# Patient Record
Sex: FEMALE | Race: WHITE | HISPANIC OR LATINO | Employment: FULL TIME | URBAN - METROPOLITAN AREA
[De-identification: names, ages, dates, MRNs, and addresses within clinical notes are randomized per-mention and may not be internally consistent; named-entity substitution may affect disease eponyms.]

---

## 2018-06-13 ENCOUNTER — OFFICE VISIT (OUTPATIENT)
Dept: URGENT CARE | Facility: PHYSICIAN GROUP | Age: 18
End: 2018-06-13
Payer: COMMERCIAL

## 2018-06-13 ENCOUNTER — HOSPITAL ENCOUNTER (OUTPATIENT)
Facility: MEDICAL CENTER | Age: 18
End: 2018-06-13
Attending: PHYSICIAN ASSISTANT
Payer: COMMERCIAL

## 2018-06-13 ENCOUNTER — HOSPITAL ENCOUNTER (OUTPATIENT)
Dept: LAB | Facility: MEDICAL CENTER | Age: 18
End: 2018-06-13
Attending: PHYSICIAN ASSISTANT
Payer: COMMERCIAL

## 2018-06-13 VITALS
OXYGEN SATURATION: 98 % | HEIGHT: 67 IN | DIASTOLIC BLOOD PRESSURE: 84 MMHG | WEIGHT: 216 LBS | BODY MASS INDEX: 33.9 KG/M2 | TEMPERATURE: 97.5 F | HEART RATE: 62 BPM | SYSTOLIC BLOOD PRESSURE: 132 MMHG

## 2018-06-13 DIAGNOSIS — R10.30 LOWER ABDOMINAL PAIN: ICD-10-CM

## 2018-06-13 DIAGNOSIS — R39.15 URINARY URGENCY: ICD-10-CM

## 2018-06-13 DIAGNOSIS — R10.10 PAIN OF UPPER ABDOMEN: ICD-10-CM

## 2018-06-13 LAB
ALBUMIN SERPL BCP-MCNC: 4.6 G/DL (ref 3.2–4.9)
ALBUMIN/GLOB SERPL: 1.4 G/DL
ALP SERPL-CCNC: 64 U/L (ref 30–99)
ALT SERPL-CCNC: 29 U/L (ref 2–50)
ANION GAP SERPL CALC-SCNC: 7 MMOL/L (ref 0–11.9)
APPEARANCE UR: CLEAR
AST SERPL-CCNC: 20 U/L (ref 12–45)
BASOPHILS # BLD AUTO: 0.6 % (ref 0–1.8)
BASOPHILS # BLD: 0.04 K/UL (ref 0–0.12)
BILIRUB SERPL-MCNC: 0.8 MG/DL (ref 0.1–1.5)
BILIRUB UR STRIP-MCNC: NORMAL MG/DL
BUN SERPL-MCNC: 9 MG/DL (ref 8–22)
CALCIUM SERPL-MCNC: 10.3 MG/DL (ref 8.5–10.5)
CHLORIDE SERPL-SCNC: 105 MMOL/L (ref 96–112)
CO2 SERPL-SCNC: 25 MMOL/L (ref 20–33)
COLOR UR AUTO: NORMAL
CREAT SERPL-MCNC: 0.58 MG/DL (ref 0.5–1.4)
EOSINOPHIL # BLD AUTO: 0.08 K/UL (ref 0–0.51)
EOSINOPHIL NFR BLD: 1.2 % (ref 0–6.9)
ERYTHROCYTE [DISTWIDTH] IN BLOOD BY AUTOMATED COUNT: 37.2 FL (ref 35.9–50)
GLOBULIN SER CALC-MCNC: 3.4 G/DL (ref 1.9–3.5)
GLUCOSE SERPL-MCNC: 104 MG/DL (ref 65–99)
GLUCOSE UR STRIP.AUTO-MCNC: NORMAL MG/DL
HCT VFR BLD AUTO: 44.9 % (ref 37–47)
HGB BLD-MCNC: 15.5 G/DL (ref 12–16)
IMM GRANULOCYTES # BLD AUTO: 0.02 K/UL (ref 0–0.11)
IMM GRANULOCYTES NFR BLD AUTO: 0.3 % (ref 0–0.9)
INT CON NEG: NEGATIVE
INT CON POS: POSITIVE
KETONES UR STRIP.AUTO-MCNC: NORMAL MG/DL
LEUKOCYTE ESTERASE UR QL STRIP.AUTO: NORMAL
LIPASE SERPL-CCNC: 16 U/L (ref 11–82)
LYMPHOCYTES # BLD AUTO: 1.62 K/UL (ref 1–4.8)
LYMPHOCYTES NFR BLD: 24 % (ref 22–41)
MCH RBC QN AUTO: 29.8 PG (ref 27–33)
MCHC RBC AUTO-ENTMCNC: 34.5 G/DL (ref 33.6–35)
MCV RBC AUTO: 86.2 FL (ref 81.4–97.8)
MONOCYTES # BLD AUTO: 0.46 K/UL (ref 0–0.85)
MONOCYTES NFR BLD AUTO: 6.8 % (ref 0–13.4)
NEUTROPHILS # BLD AUTO: 4.53 K/UL (ref 2–7.15)
NEUTROPHILS NFR BLD: 67.1 % (ref 44–72)
NITRITE UR QL STRIP.AUTO: NORMAL
NRBC # BLD AUTO: 0 K/UL
NRBC BLD-RTO: 0 /100 WBC
PH UR STRIP.AUTO: 7 [PH] (ref 5–8)
PLATELET # BLD AUTO: 309 K/UL (ref 164–446)
PMV BLD AUTO: 11 FL (ref 9–12.9)
POC URINE PREGNANCY TEST: NORMAL
POTASSIUM SERPL-SCNC: 3.9 MMOL/L (ref 3.6–5.5)
PROT SERPL-MCNC: 8 G/DL (ref 6–8.2)
PROT UR QL STRIP: NORMAL MG/DL
RBC # BLD AUTO: 5.21 M/UL (ref 4.2–5.4)
RBC UR QL AUTO: NORMAL
SODIUM SERPL-SCNC: 137 MMOL/L (ref 135–145)
SP GR UR STRIP.AUTO: 1.01
UROBILINOGEN UR STRIP-MCNC: NORMAL MG/DL
WBC # BLD AUTO: 6.8 K/UL (ref 4.8–10.8)

## 2018-06-13 PROCEDURE — 36415 COLL VENOUS BLD VENIPUNCTURE: CPT

## 2018-06-13 PROCEDURE — 85025 COMPLETE CBC W/AUTO DIFF WBC: CPT

## 2018-06-13 PROCEDURE — 99214 OFFICE O/P EST MOD 30 MIN: CPT | Performed by: PHYSICIAN ASSISTANT

## 2018-06-13 PROCEDURE — 80053 COMPREHEN METABOLIC PANEL: CPT

## 2018-06-13 PROCEDURE — 87086 URINE CULTURE/COLONY COUNT: CPT

## 2018-06-13 PROCEDURE — 81025 URINE PREGNANCY TEST: CPT | Performed by: PHYSICIAN ASSISTANT

## 2018-06-13 PROCEDURE — 81002 URINALYSIS NONAUTO W/O SCOPE: CPT | Performed by: PHYSICIAN ASSISTANT

## 2018-06-13 PROCEDURE — 83690 ASSAY OF LIPASE: CPT

## 2018-06-13 RX ORDER — ONDANSETRON 4 MG/1
4 TABLET, FILM COATED ORAL EVERY 8 HOURS PRN
Qty: 15 TAB | Refills: 0 | Status: SHIPPED | OUTPATIENT
Start: 2018-06-13 | End: 2018-06-18

## 2018-06-13 RX ORDER — SULFAMETHOXAZOLE AND TRIMETHOPRIM 800; 160 MG/1; MG/1
1 TABLET ORAL 2 TIMES DAILY
Qty: 14 TAB | Refills: 0 | Status: SHIPPED | OUTPATIENT
Start: 2018-06-13 | End: 2018-06-18

## 2018-06-13 NOTE — LETTER
June 13, 2018         Patient: Salena Anderson   YOB: 2000   Date of Visit: 6/13/2018           To Whom it May Concern:    Salena Anderson was seen in my clinic on 6/13/2018.  She is to be of work today and tomorrow to recover.      If you have any questions or concerns, please don't hesitate to call.        Sincerely,           Margarette Horner P.A.-C.  Electronically Signed

## 2018-06-13 NOTE — PROGRESS NOTES
Chief Complaint   Patient presents with   • Abdominal Pain     mid abdominal pain, vomiting, loose stools x2 days       HISTORY OF PRESENT ILLNESS: Patient is a 17 y.o. female who presents today for about 48 hours of feeling unwell with nausea/vomiting.   Patient vomited last this morning.    She has had some loose stools but not severe diarrhea.  No bloody stool or vomit.   She has been having some increased urgency and frequency of urination as well and some irritation with urination. She is having some lower back pains, bilateral, aching and inconsistent in nature.    She is having some mid to lower stomach discomfort that comes and goes but feels it is getting worse overall.   No fevers, chills or sweats.  She has hx of appendectomy.   No vaginal discharge.   She states she does not get periods as she has Implanon.  No attempted interventions for her symptoms.       There are no active problems to display for this patient.      Allergies:Patient has no known allergies.    No current [facility]-ordered outpatient prescriptions on file.     No current [facility]-ordered facility-administered medications on file.        Past Medical History:   Diagnosis Date   • Ingrown nail    • Strep throat        Social History   Substance Use Topics   • Smoking status: Never Smoker   • Smokeless tobacco: Never Used   • Alcohol use No       Family Status   Relation Status   • Mother Alive   • Father Alive   No family history on file. No pertinent FH    ROS:  Review of Systems   Constitutional: Negative for fever, chills, weight loss and malaise/fatigue.   HENT: Negative for ear pain, nosebleeds, congestion, sore throat and neck pain.    Eyes: Negative for blurred vision.   Respiratory: Negative for cough, sputum production, shortness of breath and wheezing.    Cardiovascular: Negative for chest pain, palpitations, orthopnea and leg swelling.   Gastrointestinal: SEE HPI  Genitourinary: SEE HPI    Exam:  Blood pressure 132/84, pulse 62,  "temperature 36.4 °C (97.5 °F), height 1.702 m (5' 7\"), weight 98 kg (216 lb), SpO2 98 %.  General:  Well nourished, well developed female in NAD  Eyes: PERRLA, EOM within normal limits, no conjunctival injection, no scleral icterus, visual fields and acuity grossly intact.  Mouth: reasonable hygiene, no erythema exudates or tonsillar enlargement.  Neck: no masses, range of motion within normal limits, no tracheal deviation. No lymphadenopathy  Pulmonary: Normal respiratory effort, no wheezes, crackles, or rhonchi.  Cardiovascular: regular rate and rhythm without murmurs, rubs, or gallops.  Abdomen: Soft, mild mid-lower abdomen discomfort into suprapubic region, nondistended. Normal bowel sounds. No hepatosplenomegaly or masses, or hernias. No rebound or guarding.  No CVA tenderness.   Skin: No visible rashes or lesion. Warm, pink, dry.   Extremities: no clubbing, cyanosis, or edema.  Neuro: A&O x 3. Speech normal/clear.  Normal gait.     Component Results     Component Value Ref Range & Units Status   WBC 6.8  4.8 - 10.8 K/uL Final   RBC 5.21  4.20 - 5.40 M/uL Final   Hemoglobin 15.5  12.0 - 16.0 g/dL Final   Hematocrit 44.9  37.0 - 47.0 % Final   MCV 86.2  81.4 - 97.8 fL Final   MCH 29.8  27.0 - 33.0 pg Final   MCHC 34.5  33.6 - 35.0 g/dL Final   RDW 37.2  35.9 - 50.0 fL Final   Platelet Count 309  164 - 446 K/uL Final   MPV 11.0  9.0 - 12.9 fL Final   Neutrophils-Polys 67.10  44.00 - 72.00 % Final   Lymphocytes 24.00  22.00 - 41.00 % Final   Monocytes 6.80  0.00 - 13.40 % Final   Eosinophils 1.20  0.00 - 6.90 % Final   Basophils 0.60  0.00 - 1.80 % Final   Immature Granulocytes 0.30  0.00 - 0.90 % Final   Nucleated RBC 0.00  /100 WBC Final   Neutrophils (Absolute) 4.53  2.00 - 7.15 K/uL Final   Comment:   Includes immature neutrophils, if present.   Lymphs (Absolute) 1.62  1.00 - 4.80 K/uL Final   Monos (Absolute) 0.46  0.00 - 0.85 K/uL Final   Eos (Absolute) 0.08  0.00 - 0.51 K/uL Final   Baso (Absolute) 0.04  " 0.00 - 0.12 K/uL Final   Immature Granulocytes (abs) 0.02  0.00 - 0.11 K/uL Final   NRBC (Absolute) 0.00  K/uL Final       Component Results     Component Value Ref Range & Units Status   Sodium 137  135 - 145 mmol/L Final   Potassium 3.9  3.6 - 5.5 mmol/L Final   Chloride 105  96 - 112 mmol/L Final   Co2 25  20 - 33 mmol/L Final   Anion Gap 7.0  0.0 - 11.9 Final   Glucose 104   65 - 99 mg/dL Final   Bun 9  8 - 22 mg/dL Final   Creatinine 0.58  0.50 - 1.40 mg/dL Final   Calcium 10.3  8.5 - 10.5 mg/dL Final   AST(SGOT) 20  12 - 45 U/L Final   ALT(SGPT) 29  2 - 50 U/L Final   Alkaline Phosphatase 64  30 - 99 U/L Final   Total Bilirubin 0.8  0.1 - 1.5 mg/dL Final   Albumin 4.6  3.2 - 4.9 g/dL Final   Total Protein 8.0  6.0 - 8.2 g/dL Final   Globulin 3.4  1.9 - 3.5 g/dL Final   A-G Ratio 1.4  g/dL Final       Component Results     Component Value Ref Range & Units Status   Lipase 16  11 - 82 U/L Final     Component Results     Component Value Ref Range & Units Status   POC Color light yellow  Negative Final   POC Appearance clear  Negative Final   POC Leukocyte Esterase neg  Negative Final   POC Nitrites pos  Negative Final   POC Urobiligen neg  Negative (0.2) mg/dL Final   POC Protein neg  Negative mg/dL Final   POC Urine PH 7.0  5.0 - 8.0 Final   POC Blood 2+  Negative Final   POC Specific Gravity 1.015  <1.005 - >1.030 Final   POC Ketones neg  Negative mg/dL Final   POC Bilirubin neg  Negative mg/dL Final   POC Glucose neg  Negative mg/dL Final       Component Results     Component Value Ref Range & Units Status   POC Urine Pregnancy Test neg  Negative Final       Assessment/Plan:  1. Lower abdominal pain  CBC WITH DIFFERENTIAL    COMP METABOLIC PANEL    LIPASE    POCT Urinalysis    POCT Pregnancy    ondansetron (ZOFRAN) 4 MG Tab tablet   2. Urinary urgency  URINE CULTURE(NEW)    sulfamethoxazole-trimethoprim (BACTRIM DS) 800-160 MG tablet       -labs as above, grossly unremarkable other than POCT u/a with nitrites  and blood.   Patient is endorsing UTI symptoms currently as well.   -given this and otherwise benign work up patient will be placed on Bactrim and culture sent.   -Fluids emphasized.  Void before/after intercourse.  Recommend abstain until treatment complete/symptoms resolved.   -signs and symptoms of worsening/ascending infection discussed and to seek prompt medical care should they arise.   -patient given RTC precautions regarding abdominal pain as well as ER precautions.        Supportive care, differential diagnoses, and indications for immediate follow-up discussed with patient.   Pathogenesis of diagnosis discussed including typical length and natural progression.   Instructed to return to clinic or nearest emergency department for any change in condition, further concerns, or worsening of symptoms.  Patient states understanding of the plan of care and discharge instructions.  Instructed to make an appointment, for follow up, with their primary care provider.      Margarette Horner P.A.-C.

## 2018-06-14 DIAGNOSIS — R39.15 URINARY URGENCY: ICD-10-CM

## 2018-06-14 LAB
AMBIGUOUS DTTM AMBI4: NORMAL
SIGNIFICANT IND 70042: NORMAL
SITE SITE: NORMAL
SOURCE SOURCE: NORMAL

## 2018-06-16 LAB
BACTERIA UR CULT: NORMAL
SIGNIFICANT IND 70042: NORMAL
SITE SITE: NORMAL
SOURCE SOURCE: NORMAL

## 2018-06-18 ENCOUNTER — OFFICE VISIT (OUTPATIENT)
Dept: URGENT CARE | Facility: CLINIC | Age: 18
End: 2018-06-18
Payer: COMMERCIAL

## 2018-06-18 VITALS
HEIGHT: 67 IN | TEMPERATURE: 97.8 F | HEART RATE: 77 BPM | RESPIRATION RATE: 16 BRPM | WEIGHT: 216 LBS | BODY MASS INDEX: 33.9 KG/M2 | DIASTOLIC BLOOD PRESSURE: 72 MMHG | SYSTOLIC BLOOD PRESSURE: 120 MMHG | OXYGEN SATURATION: 100 %

## 2018-06-18 DIAGNOSIS — R10.9 BELLY PAIN: ICD-10-CM

## 2018-06-18 LAB
APPEARANCE UR: NORMAL
BILIRUB UR STRIP-MCNC: NEGATIVE MG/DL
COLOR UR AUTO: YELLOW
GLUCOSE UR STRIP.AUTO-MCNC: NEGATIVE MG/DL
INT CON NEG: NEGATIVE
INT CON POS: POSITIVE
KETONES UR STRIP.AUTO-MCNC: NEGATIVE MG/DL
LEUKOCYTE ESTERASE UR QL STRIP.AUTO: NEGATIVE
NITRITE UR QL STRIP.AUTO: NEGATIVE
PH UR STRIP.AUTO: 5 [PH] (ref 5–8)
POC URINE PREGNANCY TEST: NEGATIVE
PROT UR QL STRIP: NEGATIVE MG/DL
RBC UR QL AUTO: NORMAL
SP GR UR STRIP.AUTO: 1.03
UROBILINOGEN UR STRIP-MCNC: NEGATIVE MG/DL

## 2018-06-18 PROCEDURE — 81025 URINE PREGNANCY TEST: CPT | Performed by: FAMILY MEDICINE

## 2018-06-18 PROCEDURE — 81002 URINALYSIS NONAUTO W/O SCOPE: CPT | Performed by: FAMILY MEDICINE

## 2018-06-18 PROCEDURE — 99214 OFFICE O/P EST MOD 30 MIN: CPT | Performed by: FAMILY MEDICINE

## 2018-06-18 RX ORDER — MELOXICAM 15 MG/1
15 TABLET ORAL DAILY
Qty: 7 TAB | Refills: 1 | Status: SHIPPED | OUTPATIENT
Start: 2018-06-18 | End: 2018-06-25

## 2018-06-18 RX ORDER — KETOROLAC TROMETHAMINE 30 MG/ML
60 INJECTION, SOLUTION INTRAMUSCULAR; INTRAVENOUS ONCE
Status: COMPLETED | OUTPATIENT
Start: 2018-06-18 | End: 2018-06-18

## 2018-06-18 RX ADMIN — KETOROLAC TROMETHAMINE 60 MG: 30 INJECTION, SOLUTION INTRAMUSCULAR; INTRAVENOUS at 09:53

## 2018-06-18 ASSESSMENT — ENCOUNTER SYMPTOMS
MYALGIAS: 1
FOCAL WEAKNESS: 0
NAUSEA: 0
DIARRHEA: 0
DIZZINESS: 0
VOMITING: 0
BLOOD IN STOOL: 1
CHILLS: 0
FLANK PAIN: 1
ABDOMINAL PAIN: 1
FEVER: 0

## 2018-06-18 NOTE — LETTER
June 18, 2018         Patient: Salena Anderson   YOB: 2000   Date of Visit: 6/18/2018           To Whom it May Concern:    Salena Anderson was seen in my clinic on 6/18/2018. She may return to work on 06/20/18.    If you have any questions or concerns, please don't hesitate to call.        Sincerely,           Bart Bradshaw M.D.  Electronically Signed

## 2018-06-18 NOTE — PROGRESS NOTES
"Subjective:      Salena Anderson is a 17 y.o. female who presents with LLQ Pain (diagnosed with bladder infection, taking medication and feels like it is making her LLQ painful x 5 days )    Chief Complaint   Patient presents with   • LLQ Pain     diagnosed with bladder infection, taking medication and feels like it is making her LLQ painful x 5 days         - This is a very pleasant 17 y.o. female with complaints of on/off sharp pain LLQ x 4 days. Also a little pain Lt flank. No NVFC, feeding/drinking well, no blood in stool. No urinary symptoms           ALLERGIES:  Patient has no known allergies.     PMH:  Past Medical History:   Diagnosis Date   • Ingrown nail    • Strep throat         MEDS:    Current Outpatient Prescriptions:   •  meloxicam (MOBIC) 15 MG tablet, Take 1 Tab by mouth every day for 7 days., Disp: 7 Tab, Rfl: 1    Current Facility-Administered Medications:   •  ketorolac (TORADOL) injection 60 mg, 60 mg, Intramuscular, Once, Bart Bradshaw M.D.    ** I have documented what I find to be significant in regards to past medical, social, family and surgical history  in my HPI or under PMH/PSH/FH review section, otherwise it is contributory **           HPI    Review of Systems   Constitutional: Negative for chills and fever.   Gastrointestinal: Positive for abdominal pain and blood in stool. Negative for diarrhea, nausea and vomiting.   Genitourinary: Positive for flank pain. Negative for dysuria, frequency and hematuria.   Musculoskeletal: Positive for myalgias.   Neurological: Negative for dizziness and focal weakness.          Objective:     /72   Pulse 77   Temp 36.6 °C (97.8 °F)   Resp 16   Ht 1.702 m (5' 7\")   Wt 98 kg (216 lb)   SpO2 100%   BMI 33.83 kg/m²      Physical Exam   Constitutional: She appears well-developed. No distress.   HENT:   Head: Normocephalic and atraumatic.   Neck: Neck supple.   Cardiovascular: Regular rhythm.    No murmur heard.  Pulmonary/Chest: Effort " normal. No respiratory distress.   Abdominal: Soft. She exhibits no distension. There is tenderness ( mild LLQ). There is no rebound and no guarding.   Neurological: She is alert. She exhibits normal muscle tone.   Skin: Skin is warm and dry.   Psychiatric: She has a normal mood and affect. Judgment normal.   Nursing note and vitals reviewed.              Assessment/Plan:         1. Belly pain  POCT Urinalysis    POCT Pregnancy    ketorolac (TORADOL) injection 60 mg    meloxicam (MOBIC) 15 MG tablet       * suspect ovarian cyst vs small renal stone passing. Rest hydrate. If not improving in couple days or getting worse go to ER      Dx & d/c instructions discussed w/ patient and/or family members. Follow up w/ Prvt Dr or here in 2 days if not getting better, sooner if needed,  ER if worse and UC/PCP unavailable.        Possible side effects (i.e. Rash, GI upset/constipation, sedation, elevation of BP or sugars) of any medications given discussed.

## 2018-11-27 ENCOUNTER — HOSPITAL ENCOUNTER (OUTPATIENT)
Facility: MEDICAL CENTER | Age: 18
End: 2018-11-27
Attending: EMERGENCY MEDICINE
Payer: COMMERCIAL

## 2018-11-27 ENCOUNTER — OFFICE VISIT (OUTPATIENT)
Dept: URGENT CARE | Facility: PHYSICIAN GROUP | Age: 18
End: 2018-11-27
Payer: COMMERCIAL

## 2018-11-27 VITALS
OXYGEN SATURATION: 99 % | HEART RATE: 76 BPM | RESPIRATION RATE: 18 BRPM | BODY MASS INDEX: 34.93 KG/M2 | TEMPERATURE: 97 F | DIASTOLIC BLOOD PRESSURE: 80 MMHG | SYSTOLIC BLOOD PRESSURE: 122 MMHG | WEIGHT: 223 LBS

## 2018-11-27 DIAGNOSIS — R19.7 DIARRHEA OF PRESUMED INFECTIOUS ORIGIN: ICD-10-CM

## 2018-11-27 PROCEDURE — 87899 AGENT NOS ASSAY W/OPTIC: CPT

## 2018-11-27 PROCEDURE — 99213 OFFICE O/P EST LOW 20 MIN: CPT | Performed by: EMERGENCY MEDICINE

## 2018-11-27 PROCEDURE — 87046 STOOL CULTR AEROBIC BACT EA: CPT

## 2018-11-27 PROCEDURE — 87045 FECES CULTURE AEROBIC BACT: CPT

## 2018-11-27 RX ORDER — ONDANSETRON 4 MG/1
4 TABLET, ORALLY DISINTEGRATING ORAL EVERY 6 HOURS PRN
Qty: 10 TAB | Refills: 0 | Status: ON HOLD | OUTPATIENT
Start: 2018-11-27 | End: 2021-08-07

## 2018-11-27 RX ORDER — BENZONATATE 100 MG/1
100 CAPSULE ORAL 3 TIMES DAILY PRN
Qty: 60 CAP | Refills: 0 | Status: ON HOLD | OUTPATIENT
Start: 2018-11-27 | End: 2021-08-07

## 2018-11-27 RX ORDER — ONDANSETRON 4 MG/1
4 TABLET, ORALLY DISINTEGRATING ORAL ONCE
Status: COMPLETED | OUTPATIENT
Start: 2018-11-27 | End: 2018-11-27

## 2018-11-27 RX ADMIN — ONDANSETRON 4 MG: 4 TABLET, ORALLY DISINTEGRATING ORAL at 11:14

## 2018-11-27 NOTE — LETTER
November 27, 2018        Salena Anderson  [address] [address]        Dear Salena VILLA:    PLEASE ASK FOR THE NEXT 2-3 DAYS OFF FROM WORK.    If you have any questions or concerns, please don't hesitate to call.        Sincerely,        Michael Oliva M.D.    Electronically Signed

## 2018-11-28 LAB
E COLI SXT1+2 STL IA: NORMAL
SIGNIFICANT IND 70042: NORMAL
SITE SITE: NORMAL
SOURCE SOURCE: NORMAL

## 2018-11-29 ENCOUNTER — TELEPHONE (OUTPATIENT)
Dept: URGENT CARE | Facility: PHYSICIAN GROUP | Age: 18
End: 2018-11-29

## 2018-11-29 ASSESSMENT — ENCOUNTER SYMPTOMS
VOMITING: 1
DIARRHEA: 1
SORE THROAT: 0
SPUTUM PRODUCTION: 0
FEVER: 1
EYE REDNESS: 0
NERVOUS/ANXIOUS: 0
ABDOMINAL PAIN: 1
SHORTNESS OF BREATH: 0
SENSORY CHANGE: 0
BLOOD IN STOOL: 0
EYE DISCHARGE: 0
NAUSEA: 1
COUGH: 1
SPEECH CHANGE: 0

## 2018-11-29 NOTE — PROGRESS NOTES
Subjective:      Salena Anderson is a 18 y.o. female who presents with Fever (x6 days and cough , N/V/D x1 day, right upper chest near shoulder hurts )    HPI   Pt with NVD for the past few days, no foreign travel, ccamping or antibiotics, No blood in the stool or vomitus. Pt is non smoker, with a dry cough improving. No one else is ill at home.  PMH:  has a past medical history of Ingrown nail and Strep throat. She also has no past medical history of Allergy; ASTHMA; or Diabetes.  MEDS:   Current Outpatient Prescriptions:   •  ondansetron (ZOFRAN ODT) 4 MG TABLET DISPERSIBLE, Take 1 Tab by mouth every 6 hours as needed for Nausea., Disp: 10 Tab, Rfl: 0  •  benzonatate (TESSALON) 100 MG Cap, Take 1 Cap by mouth 3 times a day as needed for Cough., Disp: 60 Cap, Rfl: 0  ALLERGIES: No Known Allergies  SURGHX: No past surgical history on file.  SOCHX:  reports that she has never smoked. She has never used smokeless tobacco. She reports that she does not drink alcohol or use drugs.  FH: family history is not on file.  Review of Systems   Constitutional: Positive for fever.   HENT: Negative for sore throat.    Eyes: Negative for discharge and redness.   Respiratory: Positive for cough. Negative for sputum production and shortness of breath.    Cardiovascular: Positive for chest pain.   Gastrointestinal: Positive for abdominal pain, diarrhea, nausea and vomiting. Negative for blood in stool.   Skin: Negative for rash.   Neurological: Negative for sensory change and speech change.   Psychiatric/Behavioral: The patient is not nervous/anxious.           Objective:     /80   Pulse 76   Temp 36.1 °C (97 °F)   Resp 18   Wt 101.2 kg (223 lb)   SpO2 99%   BMI 34.93 kg/m²      Physical Exam   Constitutional: She appears well-developed and well-nourished. No distress.   HENT:   Head: Normocephalic and atraumatic.   Right Ear: External ear normal.   Left Ear: External ear normal.   Neck: Normal range of motion.    Cardiovascular: Normal rate and regular rhythm.    Pulmonary/Chest: Effort normal and breath sounds normal. No respiratory distress. She has no wheezes. She has no rales.   Abdominal: She exhibits no distension. There is no tenderness. There is no guarding.   Musculoskeletal: Normal range of motion.   Neurological: She is alert.   Skin: Skin is warm and dry. She is not diaphoretic.   Psychiatric: She has a normal mood and affect. Her behavior is normal.   Nursing note and vitals reviewed.              Assessment/Plan:     1. Diarrhea of presumed infectious origin    - ondansetron (ZOFRAN ODT) dispertab 4 mg; Take 1 Tab by mouth Once.  - ondansetron (ZOFRAN ODT) 4 MG TABLET DISPERSIBLE; Take 1 Tab by mouth every 6 hours as needed for Nausea.  Dispense: 10 Tab; Refill: 0  - CULTURE STOOL; Future  - benzonatate (TESSALON) 100 MG Cap; Take 1 Cap by mouth 3 times a day as needed for Cough.  Dispense: 60 Cap; Refill: 0      I am recommending the patient initiate/ continue hydration efforts including the use of a vaporizer/humidifier/ netti pot. I also recommend the pt, initiate Mucinex (if older than 4) Sudafed or Dayquil if not hypertensive. In addition the patient will initiate the prescribed prescription medication/s: terssalon perles for the cough. Zofran for the nausea / vomiting. Pt will use clear liquids for the next day. No dairy for the next 5 days. She can use imodium for the diarrhea and bring in a stool for culture. I will call with the results.. If the patient's condition exacerbates with worsening dysphagia, shortness of breath, uncontrolled fever, headache or chest pressure he/she will return immediately to the urgent care or go to  the emergency department for further evaluation.  Pt will be given a work note.  Michael Oliva

## 2018-12-01 ENCOUNTER — TELEPHONE (OUTPATIENT)
Dept: URGENT CARE | Facility: PHYSICIAN GROUP | Age: 18
End: 2018-12-01

## 2018-12-01 LAB
BACTERIA STL CULT: NORMAL
E COLI SXT1+2 STL IA: NORMAL
SIGNIFICANT IND 70042: NORMAL
SITE SITE: NORMAL
SOURCE SOURCE: NORMAL

## 2019-02-05 ENCOUNTER — OFFICE VISIT (OUTPATIENT)
Dept: URGENT CARE | Facility: MEDICAL CENTER | Age: 19
End: 2019-02-05
Payer: COMMERCIAL

## 2019-02-05 VITALS
RESPIRATION RATE: 16 BRPM | HEIGHT: 67 IN | BODY MASS INDEX: 35 KG/M2 | DIASTOLIC BLOOD PRESSURE: 68 MMHG | SYSTOLIC BLOOD PRESSURE: 108 MMHG | WEIGHT: 223 LBS | TEMPERATURE: 96.9 F | OXYGEN SATURATION: 98 % | HEART RATE: 79 BPM

## 2019-02-05 DIAGNOSIS — J06.9 ACUTE URI: Primary | ICD-10-CM

## 2019-02-05 DIAGNOSIS — J02.9 SORE THROAT: ICD-10-CM

## 2019-02-05 DIAGNOSIS — R05.9 COUGH: ICD-10-CM

## 2019-02-05 DIAGNOSIS — R52 BODY ACHES: ICD-10-CM

## 2019-02-05 LAB
FLUAV+FLUBV AG SPEC QL IA: NEGATIVE
INT CON NEG: NEGATIVE
INT CON NEG: NEGATIVE
INT CON POS: POSITIVE
INT CON POS: POSITIVE
S PYO AG THROAT QL: NEGATIVE

## 2019-02-05 PROCEDURE — 87880 STREP A ASSAY W/OPTIC: CPT | Performed by: PHYSICIAN ASSISTANT

## 2019-02-05 PROCEDURE — 87804 INFLUENZA ASSAY W/OPTIC: CPT | Performed by: PHYSICIAN ASSISTANT

## 2019-02-05 PROCEDURE — 99214 OFFICE O/P EST MOD 30 MIN: CPT | Performed by: PHYSICIAN ASSISTANT

## 2019-02-05 RX ORDER — CODEINE PHOSPHATE/GUAIFENESIN 10-100MG/5
10 LIQUID (ML) ORAL 4 TIMES DAILY PRN
Qty: 200 ML | Refills: 0 | Status: SHIPPED | OUTPATIENT
Start: 2019-02-05 | End: 2019-02-10

## 2019-02-05 ASSESSMENT — ENCOUNTER SYMPTOMS
COUGH: 1
HEADACHES: 1
RHINORRHEA: 1
SWOLLEN GLANDS: 0
SPUTUM PRODUCTION: 1
ABDOMINAL PAIN: 0
DIARRHEA: 0
SORE THROAT: 1
VOMITING: 0
SHORTNESS OF BREATH: 0
FEVER: 1
WHEEZING: 0
STRIDOR: 0

## 2019-02-05 NOTE — LETTER
February 5, 2019         Patient: Salena Anderson   YOB: 2000   Date of Visit: 2/5/2019           To Whom it May Concern:    Salena Anderson was seen in my clinic on 2/5/2019. She may return to work on 02/07/2019.    If you have any questions or concerns, please don't hesitate to call.        Sincerely,           Beckie Sharif P.A.-C.  Electronically Signed

## 2019-02-05 NOTE — PROGRESS NOTES
Subjective:      Salena Anderson is a 18 y.o. female who presents with Fever (chills, cough, sore throat, x 3 days )    PMH:  has a past medical history of Ingrown nail and Strep throat. She also has no past medical history of Allergy; ASTHMA; or Diabetes.  MEDS:   Current Outpatient Prescriptions:   •  guaifenesin-codeine (TUSSI-ORGANIDIN NR) 100-10 MG/5ML syrup, Take 10 mL by mouth 4 times a day as needed for Cough for up to 5 days., Disp: 200 mL, Rfl: 0  •  ondansetron (ZOFRAN ODT) 4 MG TABLET DISPERSIBLE, Take 1 Tab by mouth every 6 hours as needed for Nausea., Disp: 10 Tab, Rfl: 0  •  benzonatate (TESSALON) 100 MG Cap, Take 1 Cap by mouth 3 times a day as needed for Cough., Disp: 60 Cap, Rfl: 0  ALLERGIES: No Known Allergies  SURGHX: History reviewed. No pertinent surgical history.  SOCHX:  reports that she has never smoked. She has never used smokeless tobacco. She reports that she does not drink alcohol or use drugs.  FH: Reviewed with patient, not pertinent to this visit.             Patient presents with:  Fever: chills, cough, sore throat, x 3 days , cough is keeping her awake at night.  + flu and + strep exposure at work, would like to be tested for both.         URI    This is a new problem. Episode onset: 3 days. The problem has been unchanged. The maximum temperature recorded prior to her arrival was 100.4 - 100.9 F. The fever has been present for 1 to 2 days. Associated symptoms include congestion, coughing, headaches, rhinorrhea and a sore throat. Pertinent negatives include no abdominal pain, diarrhea, ear pain, joint pain, plugged ear sensation, swollen glands, vomiting or wheezing. She has tried acetaminophen (motrin , otc cold medicine) for the symptoms. The treatment provided mild relief.       Review of Systems   Constitutional: Positive for fever.   HENT: Positive for congestion, rhinorrhea and sore throat. Negative for ear pain.    Respiratory: Positive for cough and sputum production.  "Negative for shortness of breath, wheezing and stridor.    Gastrointestinal: Negative for abdominal pain, diarrhea and vomiting.   Musculoskeletal: Negative for joint pain.   Neurological: Positive for headaches.   All other systems reviewed and are negative.         Objective:     /68   Pulse 79   Temp 36.1 °C (96.9 °F)   Resp 16   Ht 1.702 m (5' 7\")   Wt 101.2 kg (223 lb)   SpO2 98%   BMI 34.93 kg/m²      Physical Exam   Constitutional: She is oriented to person, place, and time. She appears well-developed and well-nourished. She is cooperative.  Non-toxic appearance. No distress.   HENT:   Head: Normocephalic and atraumatic.   Right Ear: Tympanic membrane normal.   Left Ear: Tympanic membrane normal.   Nose: Mucosal edema and rhinorrhea present.   Mouth/Throat: Uvula is midline and mucous membranes are normal. Posterior oropharyngeal erythema present. Tonsils are 1+ on the right. Tonsils are 2+ on the left. No tonsillar exudate.   Eyes: Pupils are equal, round, and reactive to light. Conjunctivae and EOM are normal.   Neck: Normal range of motion. Neck supple.   Cardiovascular: Normal rate, regular rhythm and normal heart sounds.    Pulmonary/Chest: Effort normal. No respiratory distress. She has rhonchi.   Abdominal: Soft.   Musculoskeletal: Normal range of motion.   Neurological: She is alert and oriented to person, place, and time. Gait normal.   Skin: Skin is warm and dry. Capillary refill takes less than 2 seconds.   Psychiatric: She has a normal mood and affect.   Nursing note and vitals reviewed.         Flu: neg  Strep:neg     Assessment/Plan:     1. Acute URI  guaifenesin-codeine (TUSSI-ORGANIDIN NR) 100-10 MG/5ML syrup   2. Sore throat  POCT Rapid Strep A    POCT Influenza A/B    guaifenesin-codeine (TUSSI-ORGANIDIN NR) 100-10 MG/5ML syrup   3. Body aches  POCT Rapid Strep A    POCT Influenza A/B    guaifenesin-codeine (TUSSI-ORGANIDIN NR) 100-10 MG/5ML syrup   4. Cough  POCT Rapid Strep A "    POCT Influenza A/B    guaifenesin-codeine (TUSSI-ORGANIDIN NR) 100-10 MG/5ML syrup     Discussed that I felt this was viral in nature. Did not see any evidence of a bacterial process. Discussed natural progression and sx care.    PT advised saltwater gargles/swishes  3-4 times daily until symptoms improve.     Motrin/Advil/Ibuprophen 600 mg every 6 hours as needed for pain or fever.    PT instructed not to drive or operate heavy machinery or drink alcohol while taking this medication because it contains either a narcotic or benzodiazepines which causes drowsiness. PT verbalized understanding of these instructions.     [facility] Aware web site evaluation: I have obtained and reviewed patient utilization report from [facility] pharmacy database prior to writing prescription for controlled substance.  No history of abuse.    PT should follow up with PCP in 1-2 days for re-evaluation if symptoms have not improved.  Discussed red flags and reasons to return to UC or ED.  Pt and/or family verbalized understanding of diagnosis and follow up instructions and was offered informational handout on diagnosis.  PT discharged.

## 2019-07-23 ENCOUNTER — OFFICE VISIT (OUTPATIENT)
Dept: URGENT CARE | Facility: PHYSICIAN GROUP | Age: 19
End: 2019-07-23
Payer: COMMERCIAL

## 2019-07-23 VITALS
BODY MASS INDEX: 34.46 KG/M2 | WEIGHT: 220 LBS | SYSTOLIC BLOOD PRESSURE: 120 MMHG | HEART RATE: 86 BPM | DIASTOLIC BLOOD PRESSURE: 82 MMHG | OXYGEN SATURATION: 97 % | TEMPERATURE: 98 F | RESPIRATION RATE: 16 BRPM

## 2019-07-23 DIAGNOSIS — R11.0 NAUSEA: ICD-10-CM

## 2019-07-23 DIAGNOSIS — K29.00 ACUTE GASTRITIS WITHOUT HEMORRHAGE, UNSPECIFIED GASTRITIS TYPE: ICD-10-CM

## 2019-07-23 DIAGNOSIS — R19.7 DIARRHEA, UNSPECIFIED TYPE: ICD-10-CM

## 2019-07-23 LAB
APPEARANCE UR: CLEAR
BILIRUB UR STRIP-MCNC: NEGATIVE MG/DL
COLOR UR AUTO: YELLOW
GLUCOSE UR STRIP.AUTO-MCNC: NEGATIVE MG/DL
INT CON NEG: NEGATIVE
INT CON POS: POSITIVE
KETONES UR STRIP.AUTO-MCNC: NEGATIVE MG/DL
LEUKOCYTE ESTERASE UR QL STRIP.AUTO: NORMAL
NITRITE UR QL STRIP.AUTO: NEGATIVE
PH UR STRIP.AUTO: 5.5 [PH] (ref 5–8)
POC URINE PREGNANCY TEST: NEGATIVE
PROT UR QL STRIP: NEGATIVE MG/DL
RBC UR QL AUTO: NORMAL
SP GR UR STRIP.AUTO: 1.03
UROBILINOGEN UR STRIP-MCNC: 0.2 MG/DL

## 2019-07-23 PROCEDURE — 81002 URINALYSIS NONAUTO W/O SCOPE: CPT | Performed by: PHYSICIAN ASSISTANT

## 2019-07-23 PROCEDURE — 99214 OFFICE O/P EST MOD 30 MIN: CPT | Performed by: PHYSICIAN ASSISTANT

## 2019-07-23 PROCEDURE — 81025 URINE PREGNANCY TEST: CPT | Performed by: PHYSICIAN ASSISTANT

## 2019-07-23 RX ORDER — OMEPRAZOLE 20 MG/1
20 CAPSULE, DELAYED RELEASE ORAL DAILY
Qty: 7 CAP | Refills: 0 | Status: SHIPPED | OUTPATIENT
Start: 2019-07-23 | End: 2019-07-30

## 2019-07-23 RX ORDER — ONDANSETRON 4 MG/1
4 TABLET, FILM COATED ORAL EVERY 8 HOURS PRN
Qty: 12 EACH | Refills: 0 | Status: SHIPPED | OUTPATIENT
Start: 2019-07-23 | End: 2019-07-27

## 2019-07-23 ASSESSMENT — ENCOUNTER SYMPTOMS
ABDOMINAL PAIN: 1
DIARRHEA: 1
SORE THROAT: 0
EYE REDNESS: 0
DIZZINESS: 0
EYE DISCHARGE: 0
CONSTIPATION: 0
COUGH: 0
FLATUS: 1
VOMITING: 0
HEARTBURN: 1
MYALGIAS: 0
HEADACHES: 0
CHILLS: 1
BLOOD IN STOOL: 0
NECK PAIN: 0
TINGLING: 0
NAUSEA: 1
WHEEZING: 0

## 2019-07-23 NOTE — PROGRESS NOTES
"Subjective:      Salena Anderson is a 18 y.o. female who presents with Abdominal Pain (stomach pain,nausea,diarrhea,vomiting,x 1 week)            Patient is a pleasant 18-year-old female presents to urgent care with concern regarding episode of loose stool this morning after intermittent abdominal pain for the last week.  Patient reports that she was recently prescribed ibuprofen 800 mg approximately 1 week ago and reports notable abdominal cramping and sharpness after taking such.  She does report that a \"virus \"is going around her  at this time with some fever and diarrhea.  She does report nausea however denies any vomiting.  She believes that she may be having reflux as well.  She denies any urinary symptoms and is uncertain when her last menstrual cycle was that she has the Nexplanon.      Abdominal Pain   This is a new problem. The current episode started in the past 7 days. The onset quality is gradual. The problem occurs intermittently. The pain is located in the generalized abdominal region and epigastric region. The pain is moderate. The quality of the pain is aching and sharp. The abdominal pain does not radiate. Associated symptoms include diarrhea, flatus and nausea. Pertinent negatives include no constipation, dysuria, frequency, headaches, melena, myalgias or vomiting. Exacerbated by: Ibuprofen. The pain is relieved by nothing. Treatments tried: Over-the-counter \"powder drink \" The treatment provided no relief. Her past medical history is significant for abdominal surgery. History of appendectomy       Review of Systems   Constitutional: Positive for chills and malaise/fatigue.   HENT: Negative for congestion, ear discharge and sore throat.    Eyes: Negative for discharge and redness.   Respiratory: Negative for cough and wheezing.    Gastrointestinal: Positive for abdominal pain, diarrhea, flatus, heartburn and nausea. Negative for blood in stool, constipation, melena and vomiting. "   Genitourinary: Negative for dysuria, frequency and urgency.   Musculoskeletal: Negative for myalgias and neck pain.   Skin: Negative for itching and rash.   Neurological: Negative for dizziness, tingling and headaches.          Objective:     /82 (BP Location: Left arm, Patient Position: Sitting, BP Cuff Size: Adult)   Pulse 86   Temp 36.7 °C (98 °F) (Temporal)   Resp 16   Wt 99.8 kg (220 lb)   SpO2 97%   BMI 34.46 kg/m²      Physical Exam   Constitutional: She is oriented to person, place, and time. She appears well-developed and well-nourished.   HENT:   Head: Normocephalic and atraumatic.   Right Ear: External ear normal.   Left Ear: External ear normal.   Nose: Nose normal.   Mouth/Throat: Oropharynx is clear and moist. No oropharyngeal exudate.   Eyes: Pupils are equal, round, and reactive to light. EOM are normal.   Neck: Normal range of motion. Neck supple.   Cardiovascular: Normal rate.    No murmur heard.  Pulmonary/Chest: Effort normal and breath sounds normal. No respiratory distress.   Abdominal: Soft. She exhibits no mass. There is no rebound and no guarding. No hernia.   Minimal mid-epigastric tenderness- without rigidity.   Hyperactive bowel sounds. Neg. Heel tap.    Lymphadenopathy:     She has no cervical adenopathy.   Neurological: She is alert and oriented to person, place, and time.   Skin: Skin is warm. No rash noted. No pallor.   Good skin turgor.      Psychiatric: She has a normal mood and affect. Her behavior is normal.   Vitals reviewed.              Assessment/Plan:     1. Diarrhea, unspecified type  - POCT Urinalysis  - POCT Pregnancy    2. Acute gastritis without hemorrhage, unspecified gastritis type  - POCT Urinalysis  - hyoscyamine-maalox plus-lidocaine viscous (GI COCKTAIL); Take 30 mL by mouth Once for 1 dose.  Dispense: 30 mL; Refill: 0  - omeprazole (PRILOSEC) 20 MG delayed-release capsule; Take 1 Cap by mouth every day for 7 days.  Dispense: 7 Cap; Refill: 0    3.  Nausea  - ondansetron (ZOFRAN) 4 MG Tab tablet; Take 1 Tab by mouth every 8 hours as needed for Nausea/Vomiting for up to 4 days.  Dispense: 12 Each; Refill: 0    Urinalysis without infection, pregnancy is negative.  Recheck after GI cocktail today-patient with significant improvement of abdominal discomfort.  She is feeling significantly better.  Suspect that ibuprofen is triggering recent gastritis-encouraged to discontinue medication and trial Tylenol.  Also encouraged to increase fluids and brat diet.  We will place the patient on omeprazole for 1 week and discuss further diet changes.  Work note provided.  Patient given precautionary s/sx that mandate immediate follow up and evaluation in the ED. Advised of risks of not doing so.    DDX, Supportive care, and indications for immediate follow-up discussed with patient.    Instructed to return to clinic or nearest emergency department if we are not available for any change in condition, further concerns, or worsening of symptoms.    The patient demonstrated a good understanding and agreed with the treatment plan.  Please note that this dictation was created using voice recognition software. I have made every reasonable attempt to correct obvious errors, but I expect that there are errors of grammar and possibly content that I did not discover before finalizing the note.

## 2019-07-23 NOTE — LETTER
July 23, 2019         Patient: Salena Anderson   YOB: 2000   Date of Visit: 7/23/2019           To Whom it May Concern:    Salena Anderson was seen in my clinic on 7/23/2019. Please excuse this patient from work today due to recent ailment.     If you have any questions or concerns, please don't hesitate to call.        Sincerely,           Humberto Goetz P.A.-C.  Electronically Signed

## 2019-07-24 ENCOUNTER — OFFICE VISIT (OUTPATIENT)
Dept: URGENT CARE | Facility: PHYSICIAN GROUP | Age: 19
End: 2019-07-24
Payer: COMMERCIAL

## 2019-07-24 VITALS
OXYGEN SATURATION: 96 % | HEART RATE: 90 BPM | TEMPERATURE: 97.7 F | BODY MASS INDEX: 34.53 KG/M2 | SYSTOLIC BLOOD PRESSURE: 108 MMHG | DIASTOLIC BLOOD PRESSURE: 72 MMHG | RESPIRATION RATE: 18 BRPM | HEIGHT: 67 IN | WEIGHT: 220 LBS

## 2019-07-24 DIAGNOSIS — R10.30 LOWER ABDOMINAL PAIN: ICD-10-CM

## 2019-07-24 DIAGNOSIS — R11.2 NAUSEA AND VOMITING, INTRACTABILITY OF VOMITING NOT SPECIFIED, UNSPECIFIED VOMITING TYPE: ICD-10-CM

## 2019-07-24 DIAGNOSIS — R19.7 DIARRHEA, UNSPECIFIED TYPE: ICD-10-CM

## 2019-07-24 PROCEDURE — 99214 OFFICE O/P EST MOD 30 MIN: CPT | Performed by: NURSE PRACTITIONER

## 2019-07-24 RX ORDER — ONDANSETRON 4 MG/1
4 TABLET, ORALLY DISINTEGRATING ORAL EVERY 6 HOURS PRN
Qty: 10 TAB | Refills: 0 | Status: ON HOLD | OUTPATIENT
Start: 2019-07-24 | End: 2021-08-07

## 2019-07-24 ASSESSMENT — ENCOUNTER SYMPTOMS
DIARRHEA: 1
CHILLS: 1
WEAKNESS: 0
FLANK PAIN: 0
FEVER: 0
NAUSEA: 1
VOMITING: 1
FATIGUE: 1
FOCAL WEAKNESS: 0
NEUROLOGICAL NEGATIVE: 1
NUMBER OF EPISODES OF EMESIS TODAY: 1
ABDOMINAL PAIN: 1
SENSORY CHANGE: 0
BLOOD IN STOOL: 0
TINGLING: 0

## 2019-07-24 NOTE — LETTER
July 24, 2019         Patient: Salena Anderson   YOB: 2000   Date of Visit: 7/24/2019           To Whom it May Concern:    Salena Anderson was seen in my clinic on 7/24/2019. She may return to work on 7/26/2019.    If you have any questions or concerns, please don't hesitate to call.        Sincerely,           CLARISSA Laboy.  Electronically Signed

## 2019-07-25 ENCOUNTER — TELEPHONE (OUTPATIENT)
Dept: URGENT CARE | Facility: PHYSICIAN GROUP | Age: 19
End: 2019-07-25

## 2019-07-25 ENCOUNTER — HOSPITAL ENCOUNTER (OUTPATIENT)
Dept: RADIOLOGY | Facility: MEDICAL CENTER | Age: 19
End: 2019-07-25
Attending: NURSE PRACTITIONER
Payer: COMMERCIAL

## 2019-07-25 DIAGNOSIS — R10.30 LOWER ABDOMINAL PAIN: ICD-10-CM

## 2019-07-25 DIAGNOSIS — R10.9 ABDOMINAL PAIN, UNSPECIFIED ABDOMINAL LOCATION: ICD-10-CM

## 2019-07-25 PROCEDURE — 700117 HCHG RX CONTRAST REV CODE 255: Performed by: NURSE PRACTITIONER

## 2019-07-25 PROCEDURE — 74177 CT ABD & PELVIS W/CONTRAST: CPT

## 2019-07-25 RX ADMIN — IOHEXOL 100 ML: 350 INJECTION, SOLUTION INTRAVENOUS at 11:39

## 2019-07-25 RX ADMIN — IOHEXOL 25 ML: 240 INJECTION, SOLUTION INTRATHECAL; INTRAVASCULAR; INTRAVENOUS; ORAL at 11:39

## 2019-07-25 NOTE — TELEPHONE ENCOUNTER
Patient phoned regarding CT of the abdomen results.  Advised patient of incidental finding of nonspecific 4 mm right lower lobe pulmonary nodule.  Advise follow-up with PCP.  Referral given for family practice.  Advised patient of 3.4 cm left ovarian cyst.  Advise follow-up with OB/GYN.  Patient states she already has an OB/GYN.  Patient reports recurring symptoms of abdominal problems.  Referral given for gastroenterology consultation.

## 2019-07-25 NOTE — PROGRESS NOTES
Subjective:     Salena Anderson is a 18 y.o. female who presents for Emesis (diarrhea, nausea, x 1 week abd pain )       Emesis   This is a new problem. The problem has been gradually worsening. Associated symptoms include abdominal pain, chills, fatigue, nausea and vomiting. Pertinent negatives include no fever or weakness.     Patient initially seen in urgent care yesterday for complaints of diarrhea, abdominal pain, and nausea which started 1 week prior.  She was given a GI cocktail and she was started on omeprazole and ondansetron.    Patient returns to urgent care today with reports that her symptoms are not improved.  She states she has attempted to take the ondansetron orally but keeps throwing it up.  Also continues to report lower abdominal and upper abdominal pain and diarrhea.  Reports chills.    Prior surgical history: Appendectomy.  Denies blood in the stool or emesis.  Denies recent foreign travel.    PMH:  has a past medical history of Ingrown nail and Strep throat. She also has no past medical history of Allergy; ASTHMA; or Diabetes.    MEDS:   Current Outpatient Prescriptions:   •  ondansetron (ZOFRAN ODT) 4 MG TABLET DISPERSIBLE, Take 1 Tab by mouth every 6 hours as needed for Nausea., Disp: 10 Tab, Rfl: 0  •  omeprazole (PRILOSEC) 20 MG delayed-release capsule, Take 1 Cap by mouth every day for 7 days., Disp: 7 Cap, Rfl: 0  •  ondansetron (ZOFRAN ODT) 4 MG TABLET DISPERSIBLE, Take 1 Tab by mouth every 6 hours as needed for Nausea., Disp: 10 Tab, Rfl: 0  •  ondansetron (ZOFRAN) 4 MG Tab tablet, Take 1 Tab by mouth every 8 hours as needed for Nausea/Vomiting for up to 4 days., Disp: 12 Each, Rfl: 0  •  benzonatate (TESSALON) 100 MG Cap, Take 1 Cap by mouth 3 times a day as needed for Cough. (Patient not taking: Reported on 7/23/2019), Disp: 60 Cap, Rfl: 0    ALLERGIES: No Known Allergies    SURGHX: No past surgical history on file.    SOCHX:  reports that she has never smoked. She has never used  "smokeless tobacco. She reports that she does not drink alcohol or use drugs.     FH: Reviewed with patient, not pertinent to this visit.    Review of Systems   Constitutional: Positive for chills, fatigue and malaise/fatigue. Negative for fever.   Gastrointestinal: Positive for abdominal pain, diarrhea, nausea and vomiting. Negative for blood in stool.   Genitourinary: Negative.  Negative for dysuria, flank pain, frequency and urgency.   Neurological: Negative.  Negative for tingling, sensory change, focal weakness and weakness.   All other systems reviewed and are negative.    Objective:     /72 (BP Location: Left arm, Patient Position: Sitting, BP Cuff Size: Large adult)   Pulse 90   Temp 36.5 °C (97.7 °F) (Temporal)   Resp 18   Ht 1.702 m (5' 7\")   Wt 99.8 kg (220 lb)   LMP 06/24/2019 (Approximate)   SpO2 96%   Breastfeeding? No   BMI 34.46 kg/m²     Physical Exam   Constitutional: She is oriented to person, place, and time. She appears well-developed and well-nourished. She is cooperative.  Non-toxic appearance. No distress.   HENT:   Right Ear: External ear normal.   Left Ear: External ear normal.   Nose: Nose normal.   Mouth/Throat: Mucous membranes are normal.   Eyes: Conjunctivae and EOM are normal.   Neck: Normal range of motion.   Cardiovascular: Normal rate, regular rhythm, normal heart sounds and normal pulses.    Pulmonary/Chest: Effort normal and breath sounds normal. No respiratory distress. She has no decreased breath sounds.   Abdominal: Soft. Bowel sounds are normal. She exhibits no distension. There is tenderness in the right upper quadrant, epigastric area, suprapubic area and left lower quadrant.   Musculoskeletal: Normal range of motion. She exhibits no deformity.   Neurological: She is alert and oriented to person, place, and time. She has normal strength. No sensory deficit.   Skin: Skin is warm, dry and intact. Capillary refill takes less than 2 seconds.   Psychiatric: She has " a normal mood and affect. Her behavior is normal.   Vitals reviewed.       Assessment/Plan:     1. Nausea and vomiting, intractability of vomiting not specified, unspecified vomiting type  - ondansetron (ZOFRAN ODT) 4 MG TABLET DISPERSIBLE; Take 1 Tab by mouth every 6 hours as needed for Nausea.  Dispense: 10 Tab; Refill: 0    2. Lower abdominal pain  - CT-ABDOMEN-PELVIS WITH; Future    3. Diarrhea, unspecified type    Various differentials of patient symptoms discussed including likely viral etiology.  Patient reports that she has had a history of recurring gastrointestinal problems which seem to be getting worse and more frequent as she gets older.    Rx sent electronically for ODT form of ondansetron.    VS stable, NAD. Discussed close monitoring and supportive measures including increasing fluids to prevent dehydration and rest as well as OTC symptom management.  Advised to continue with omeprazole.    Contingent CT abdomen pelvis with contrast scheduled for tomorrow if patient's symptoms are not improved.    Strict ER/return precautions advised.    Patient advised to: Return for 1) Symptoms don't improve or worsen, or go to ER, 2) Follow up with primary care in 7-10 days.    Differential diagnosis, natural history, supportive care, and indications for immediate follow-up discussed. All questions answered. Patient agrees with the plan of care.

## 2019-07-29 ENCOUNTER — HOSPITAL ENCOUNTER (OUTPATIENT)
Dept: LAB | Facility: MEDICAL CENTER | Age: 19
End: 2019-07-29
Attending: FAMILY MEDICINE
Payer: COMMERCIAL

## 2019-07-29 LAB
ALBUMIN SERPL BCP-MCNC: 4.4 G/DL (ref 3.2–4.9)
ALBUMIN/GLOB SERPL: 1.4 G/DL
ALP SERPL-CCNC: 62 U/L (ref 30–99)
ALT SERPL-CCNC: 24 U/L (ref 2–50)
ANION GAP SERPL CALC-SCNC: 12 MMOL/L (ref 0–11.9)
AST SERPL-CCNC: 19 U/L (ref 12–45)
BASOPHILS # BLD AUTO: 0.8 % (ref 0–1.8)
BASOPHILS # BLD: 0.06 K/UL (ref 0–0.12)
BILIRUB SERPL-MCNC: 0.8 MG/DL (ref 0.1–1.5)
BUN SERPL-MCNC: 10 MG/DL (ref 8–22)
CALCIUM SERPL-MCNC: 9.6 MG/DL (ref 8.5–10.5)
CHLORIDE SERPL-SCNC: 105 MMOL/L (ref 96–112)
CHOLEST SERPL-MCNC: 157 MG/DL (ref 100–199)
CO2 SERPL-SCNC: 22 MMOL/L (ref 20–33)
CREAT SERPL-MCNC: 0.49 MG/DL (ref 0.5–1.4)
EOSINOPHIL # BLD AUTO: 0.08 K/UL (ref 0–0.51)
EOSINOPHIL NFR BLD: 1 % (ref 0–6.9)
ERYTHROCYTE [DISTWIDTH] IN BLOOD BY AUTOMATED COUNT: 37.6 FL (ref 35.9–50)
FASTING STATUS PATIENT QL REPORTED: NORMAL
GLOBULIN SER CALC-MCNC: 3.2 G/DL (ref 1.9–3.5)
GLUCOSE SERPL-MCNC: 86 MG/DL (ref 65–99)
HCT VFR BLD AUTO: 45.3 % (ref 37–47)
HDLC SERPL-MCNC: 29 MG/DL
HGB BLD-MCNC: 15 G/DL (ref 12–16)
IMM GRANULOCYTES # BLD AUTO: 0.03 K/UL (ref 0–0.11)
IMM GRANULOCYTES NFR BLD AUTO: 0.4 % (ref 0–0.9)
LDLC SERPL CALC-MCNC: 106 MG/DL
LYMPHOCYTES # BLD AUTO: 1.5 K/UL (ref 1–4.8)
LYMPHOCYTES NFR BLD: 19.7 % (ref 22–41)
MCH RBC QN AUTO: 29.1 PG (ref 27–33)
MCHC RBC AUTO-ENTMCNC: 33.1 G/DL (ref 33.6–35)
MCV RBC AUTO: 87.8 FL (ref 81.4–97.8)
MONOCYTES # BLD AUTO: 0.49 K/UL (ref 0–0.85)
MONOCYTES NFR BLD AUTO: 6.4 % (ref 0–13.4)
NEUTROPHILS # BLD AUTO: 5.46 K/UL (ref 2–7.15)
NEUTROPHILS NFR BLD: 71.7 % (ref 44–72)
NRBC # BLD AUTO: 0 K/UL
NRBC BLD-RTO: 0 /100 WBC
PLATELET # BLD AUTO: 334 K/UL (ref 164–446)
PMV BLD AUTO: 11.1 FL (ref 9–12.9)
POTASSIUM SERPL-SCNC: 3.9 MMOL/L (ref 3.6–5.5)
PROT SERPL-MCNC: 7.6 G/DL (ref 6–8.2)
RBC # BLD AUTO: 5.16 M/UL (ref 4.2–5.4)
SODIUM SERPL-SCNC: 139 MMOL/L (ref 135–145)
TRIGL SERPL-MCNC: 111 MG/DL (ref 0–149)
TSH SERPL DL<=0.005 MIU/L-ACNC: 4.8 UIU/ML (ref 0.38–5.33)
WBC # BLD AUTO: 7.6 K/UL (ref 4.8–10.8)

## 2019-07-29 PROCEDURE — 85025 COMPLETE CBC W/AUTO DIFF WBC: CPT

## 2019-07-29 PROCEDURE — 80061 LIPID PANEL: CPT

## 2019-07-29 PROCEDURE — 84443 ASSAY THYROID STIM HORMONE: CPT

## 2019-07-29 PROCEDURE — 36415 COLL VENOUS BLD VENIPUNCTURE: CPT

## 2019-07-29 PROCEDURE — 80053 COMPREHEN METABOLIC PANEL: CPT

## 2020-01-12 ENCOUNTER — OFFICE VISIT (OUTPATIENT)
Dept: URGENT CARE | Facility: CLINIC | Age: 20
End: 2020-01-12
Payer: COMMERCIAL

## 2020-02-12 ENCOUNTER — OFFICE VISIT (OUTPATIENT)
Dept: URGENT CARE | Facility: PHYSICIAN GROUP | Age: 20
End: 2020-02-12
Payer: COMMERCIAL

## 2020-02-12 VITALS
OXYGEN SATURATION: 99 % | TEMPERATURE: 97 F | BODY MASS INDEX: 36.73 KG/M2 | SYSTOLIC BLOOD PRESSURE: 122 MMHG | DIASTOLIC BLOOD PRESSURE: 68 MMHG | HEIGHT: 67 IN | HEART RATE: 68 BPM | RESPIRATION RATE: 20 BRPM | WEIGHT: 234 LBS

## 2020-02-12 DIAGNOSIS — J22 LOWER RESPIRATORY INFECTION (E.G., BRONCHITIS, PNEUMONIA, PNEUMONITIS, PULMONITIS): ICD-10-CM

## 2020-02-12 DIAGNOSIS — J02.8 ACUTE PHARYNGITIS DUE TO OTHER SPECIFIED ORGANISMS: ICD-10-CM

## 2020-02-12 PROCEDURE — 99204 OFFICE O/P NEW MOD 45 MIN: CPT | Performed by: INTERNAL MEDICINE

## 2020-02-12 RX ORDER — DOXYCYCLINE 100 MG/1
100 TABLET ORAL 2 TIMES DAILY
Qty: 14 TAB | Refills: 0 | Status: ON HOLD | OUTPATIENT
Start: 2020-02-12 | End: 2021-08-07

## 2020-02-12 RX ORDER — PREDNISONE 20 MG/1
20 TABLET ORAL DAILY
Qty: 5 TAB | Refills: 0 | Status: SHIPPED | OUTPATIENT
Start: 2020-02-12 | End: 2020-02-17

## 2020-02-12 ASSESSMENT — ENCOUNTER SYMPTOMS
COUGH: 1
TROUBLE SWALLOWING: 0
HEADACHES: 1
SHORTNESS OF BREATH: 1

## 2020-02-12 NOTE — LETTER
[facility] [address] [address]     February 12, 2020    Patient: Salena Anderson   YOB: 2000   Date of Visit: 2/12/2020       To Whom It May Concern:    Salena Anderson was seen and treated in our department on 2/12/2020.     Patient is able to return to work 02/18/2020 with no Restrictions.    Sincerely,     Griselda Padilla, Med Ass't

## 2020-02-12 NOTE — PROGRESS NOTES
Subjective:     Salena Anderson is a 19 y.o. female who presents for Pharyngitis (Sore throat, headaches, chest discomfort x1week )       Pharyngitis    This is a new problem. The current episode started in the past 7 days. The problem has been waxing and waning. There has been no fever. Associated symptoms include congestion, coughing, headaches and shortness of breath. Pertinent negatives include no trouble swallowing.     Past Medical History:   Diagnosis Date   • Ingrown nail    • Strep throat      Past Surgical History:   Procedure Laterality Date   • APPENDECTOMY       Social History     Socioeconomic History   • Marital status: Single     Spouse name: Not on file   • Number of children: Not on file   • Years of education: Not on file   • Highest education level: Not on file   Occupational History   • Not on file   Social Needs   • Financial resource strain: Not on file   • Food insecurity     Worry: Not on file     Inability: Not on file   • Transportation needs     Medical: Not on file     Non-medical: Not on file   Tobacco Use   • Smoking status: Never Smoker   • Smokeless tobacco: Never Used   Substance and Sexual Activity   • Alcohol use: No   • Drug use: No   • Sexual activity: Never   Lifestyle   • Physical activity     Days per week: Not on file     Minutes per session: Not on file   • Stress: Not on file   Relationships   • Social connections     Talks on phone: Not on file     Gets together: Not on file     Attends [employer] service: Not on file     Active member of club or organization: Not on file     Attends meetings of clubs or organizations: Not on file     Relationship status: Not on file   • Intimate partner violence     Fear of current or ex partner: Not on file     Emotionally abused: Not on file     Physically abused: Not on file     Forced sexual activity: Not on file   Other Topics Concern   • Not on file   Social History Narrative   • Not on file    History reviewed. No pertinent family  "history. Review of Systems   HENT: Positive for congestion. Negative for trouble swallowing.    Respiratory: Positive for cough and shortness of breath.    Cardiovascular: Positive for chest pain (mild with cough).   Neurological: Positive for headaches.   All other systems reviewed and are negative.  No Known Allergies   Objective:   /68   Pulse 68   Temp 36.1 °C (97 °F) (Temporal)   Resp 20   Ht 1.702 m (5' 7\")   Wt 106.1 kg (234 lb)   SpO2 99%   Breastfeeding No   BMI 36.65 kg/m²   Physical Exam  Constitutional:       General: She is not in acute distress.     Appearance: She is well-developed.   HENT:      Head: Normocephalic and atraumatic.      Right Ear: Tympanic membrane, ear canal and external ear normal.      Left Ear: Tympanic membrane, ear canal and external ear normal.      Nose: Mucosal edema and rhinorrhea present.      Mouth/Throat:      Mouth: Mucous membranes are moist.      Pharynx: Oropharynx is clear. Uvula midline. Posterior oropharyngeal erythema present.      Tonsils: No tonsillar abscesses.   Eyes:      Conjunctiva/sclera: Conjunctivae normal.   Neck:      Musculoskeletal: No neck rigidity.   Cardiovascular:      Rate and Rhythm: Normal rate and regular rhythm.   Pulmonary:      Effort: Pulmonary effort is normal. No respiratory distress.      Breath sounds: Normal breath sounds.   Chest:      Chest wall: Tenderness (mild on palpation) present.   Lymphadenopathy:      Cervical: No cervical adenopathy.   Skin:     General: Skin is warm and dry.      Capillary Refill: Capillary refill takes less than 2 seconds.   Neurological:      Mental Status: She is alert and oriented to person, place, and time.      Sensory: No sensory deficit.      Deep Tendon Reflexes: Reflexes are normal and symmetric.   Psychiatric:         Mood and Affect: Mood normal.         Behavior: Behavior normal.           Assessment/Plan:   Assessment    1. Lower respiratory infection (e.g., bronchitis, " pneumonia, pneumonitis, pulmonitis)  - doxycycline monohydrate (ADOXA) 100 MG tablet; Take 1 Tab by mouth 2 times a day.  Dispense: 14 Tab; Refill: 0  - predniSONE (DELTASONE) 20 MG Tab; Take 1 Tab by mouth every day for 5 days.  Dispense: 5 Tab; Refill: 0    2. Acute pharyngitis due to other specified organisms    High risk diagnoses including flu, pneumonia, Toby sepsis was considered in the differential diagnosis  Differential diagnosis, natural history, supportive care, and indications for immediate follow-up discussed.

## 2020-03-06 ENCOUNTER — OCCUPATIONAL MEDICINE (OUTPATIENT)
Dept: URGENT CARE | Facility: PHYSICIAN GROUP | Age: 20
End: 2020-03-06
Payer: COMMERCIAL

## 2020-03-06 ENCOUNTER — HOSPITAL ENCOUNTER (OUTPATIENT)
Dept: RADIOLOGY | Facility: MEDICAL CENTER | Age: 20
End: 2020-03-06
Attending: NURSE PRACTITIONER
Payer: COMMERCIAL

## 2020-03-06 VITALS
HEART RATE: 77 BPM | WEIGHT: 225 LBS | OXYGEN SATURATION: 99 % | RESPIRATION RATE: 15 BRPM | BODY MASS INDEX: 35.31 KG/M2 | TEMPERATURE: 99.2 F | HEIGHT: 67 IN | DIASTOLIC BLOOD PRESSURE: 88 MMHG | SYSTOLIC BLOOD PRESSURE: 130 MMHG

## 2020-03-06 DIAGNOSIS — M25.571 ACUTE RIGHT ANKLE PAIN: ICD-10-CM

## 2020-03-06 DIAGNOSIS — S93.401A SPRAIN OF RIGHT ANKLE, UNSPECIFIED LIGAMENT, INITIAL ENCOUNTER: ICD-10-CM

## 2020-03-06 PROCEDURE — 73610 X-RAY EXAM OF ANKLE: CPT | Mod: RT

## 2020-03-06 PROCEDURE — 99203 OFFICE O/P NEW LOW 30 MIN: CPT | Mod: 29 | Performed by: NURSE PRACTITIONER

## 2020-03-06 ASSESSMENT — ENCOUNTER SYMPTOMS
FOCAL WEAKNESS: 0
SENSORY CHANGE: 0
MYALGIAS: 1
TINGLING: 0

## 2020-03-06 ASSESSMENT — FIBROSIS 4 INDEX: FIB4 SCORE: 0.24

## 2020-03-06 ASSESSMENT — PAIN SCALES - GENERAL: PAINLEVEL: 8=MODERATE-SEVERE PAIN

## 2020-03-06 NOTE — LETTER
[facility] [place]  [address] [place] [address]  Phone:  [phone] - Fax:  [phone]   [facility] Progress Report and Disability Certification  Date of Service: 3/6/2020   No Show:  No  Date / Time of Next Visit: 3/10/2020   Claim Information   Patient Name: Salena Anderson  Claim Number:     Employer:    Date of Injury: 3/6/2020     Insurer / TPA: ID / SSN:     Occupation: [employer]   Diagnosis: Diagnoses of Sprain of right ankle, unspecified ligament, initial encounter and Acute right ankle pain were pertinent to this visit.    Medical Information   Related to Industrial Injury? Yes    Subjective Complaints:  DOI:3/6/20. 19 year old female with right ankle pain after a fall down steps after tripping. She has moderate to severe localized pain to the latera aspect of right ankle. She denies associated foot or knee pain on this side. She has no distal paresthesia or focal weakness. Pain is worse with weight bearing and ambulation. No treatment has been done. No second job and no prior history of injury to this ankle   Objective Findings: Physical Exam  Constitutional:       Appearance: Normal appearance.   Cardiovascular:      Rate and Rhythm: Normal rate and regular rhythm.      Heart sounds: No murmur.   Pulmonary:      Effort: Pulmonary effort is normal.      Breath sounds: Normal breath sounds.   Musculoskeletal:      Right ankle: She exhibits decreased range of motion and swelling. She exhibits no ecchymosis, no deformity and no laceration. Tenderness. Lateral malleolus tenderness found. Achilles tendon normal.   Skin:     General: Skin is warm and dry.      Findings: No bruising.   Neurological:      General: No focal deficit present.      Mental Status: She is alert.        Pre-Existing Condition(s):     Assessment:   Initial Visit    Status: Additional Care Required  Permanent Disability:No    Plan: Medication    Diagnostics: X-ray    Comments:        Disability Information   Status: Released to Full Duty    From:  3/6/2020  Through: 3/10/2020 Restrictions are: Temporary   Physical Restrictions   Sitting:    Standing:    Stooping:    Bending:      Squatting:    Walking:    Climbing:    Pushing:      Pulling:    Other:    Reaching Above Shoulder (L):   Reaching Above Shoulder (R):       Reaching Below Shoulder (L):    Reaching Below Shoulder (R):      Not to exceed Weight Limits   Carrying(hrs):   Weight Limit(lb):   Lifting(hrs):   Weight  Limit(lb):     Comments:      Repetitive Actions   Hands: i.e. Fine Manipulations from Grasping:     Feet: i.e. Operating Foot Controls:     Driving / Operate Machinery:     Physician Name: SHASTA Mosher Physician Signature: TONY Bustillo e-Signature: Dr. Andre Coleman, Medical Director   Clinic Name / Location: [address] [address] Clinic Phone Number: Dept: [phone]   Appointment Time: 6:10 Pm Visit Start Time: 6:19 PM   Check-In Time:  6:17 Pm Visit Discharge Time:  7:02 PM   Original-Treating Physician or Chiropractor    Page 2-Insurer/TPA    Page 3-Employer    Page 4-Employee

## 2020-03-06 NOTE — LETTER
[facility] [place]  [address]  [place] [address]  Phone:  [phone] - Fax:  [phone]   [facility] Progress Report and Disability Certification  Date of Service: 3/6/2020   No Show:  No  Date / Time of Next Visit: 3/10/2020   Claim Information   Patient Name: Salena Anderson  Claim Number:     Employer:   *** Date of Injury: 3/6/2020     Insurer / TPA: [employer] *** ID / SSN:     Occupation: [employer]  *** Diagnosis: The encounter diagnosis was Acute right ankle pain.    Medical Information   Related to Industrial Injury? Yes ***   Subjective Complaints:  DOI:3/6/20. 19 year old female with right ankle pain after a fall down steps after tripping. She has moderate to severe localized pain to the latera aspect of right ankle. She denies associated foot or knee pain on this side. She has no distal paresthesia or focal weakness. Pain is worse with weight bearing and ambulation. No treatment has been done. No second job and no prior history of injury to this ankle   Objective Findings: Physical Exam  Constitutional:       Appearance: Normal appearance.   Cardiovascular:      Rate and Rhythm: Normal rate and regular rhythm.      Heart sounds: No murmur.   Pulmonary:      Effort: Pulmonary effort is normal.      Breath sounds: Normal breath sounds.   Musculoskeletal:      Right ankle: She exhibits decreased range of motion and swelling. She exhibits no ecchymosis, no deformity and no laceration. Tenderness. Lateral malleolus tenderness found. Achilles tendon normal.   Skin:     General: Skin is warm and dry.      Findings: No bruising.   Neurological:      General: No focal deficit present.      Mental Status: She is alert.        Pre-Existing Condition(s):     Assessment:   Initial Visit    Status: Additional Care Required  Permanent Disability:No    Plan: Medication    Diagnostics: X-ray    Comments:       Disability Information   Status: Released to  Full Duty    From:  3/6/2020  Through: 3/10/2020 Restrictions are: Temporary   Physical Restrictions   Sitting:    Standing:    Stooping:    Bending:      Squatting:    Walking:    Climbing:    Pushing:      Pulling:    Other:    Reaching Above Shoulder (L):   Reaching Above Shoulder (R):       Reaching Below Shoulder (L):    Reaching Below Shoulder (R):      Not to exceed Weight Limits   Carrying(hrs):   Weight Limit(lb):   Lifting(hrs):   Weight  Limit(lb):     Comments:      Repetitive Actions   Hands: i.e. Fine Manipulations from Grasping:     Feet: i.e. Operating Foot Controls:     Driving / Operate Machinery:     Physician Name: SHASTA Mosher Physician Signature: TONY Bustillo e-Signature: Dr. Andre Coleman, Medical Director   Clinic Name / Location: [address] [address] Clinic Phone Number: Dept: [phone]   Appointment Time: 6:10 Pm Visit Start Time: 6:19 PM   Check-In Time:  6:17 Pm Visit Discharge Time:  ***   Original-Treating Physician or Chiropractor    Page 2-Insurer/TPA    Page 3-Employer    Page 4-Employee

## 2020-03-06 NOTE — LETTER
"EMPLOYEE’S CLAIM FOR COMPENSATION/ REPORT OF INITIAL TREATMENT  FORM C-4    EMPLOYEE’S CLAIM - PROVIDE ALL INFORMATION REQUESTED   First Name  Salena Last Name  Justin Birthdate                    2000                Sex  female Claim Number   Home Address  [address] Age  19 y.o. 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 (if applicable)  Walking    How did this injury or occupational disease occur? (Be specific an answer in detail. Use additional sheet if necessary)  Walking and fell down the stairs and landed on my right leg and hit both knees on ground    If you believe that you have an occupational disease, when did you first have knowledge of the disability and it relationship to your employment?  N/A Witnesses to the Accident  [place]      Nature of Injury or Occupational Disease  Workers' Compensation  Part(s) of Body Injured or Affected  Lower Leg (R), ,     I certify that the above is true and correct to the best of my knowledge and that I have provided this information in order to obtain the benefits of " [place] inclusive or Chapter 617 of [place]).  I hereby authorize any physician, chiropractor, surgeon, practitioner, or other person, any hospital, including [facility] or [facility], any medical service organization, any insurance company, or other institution or organization to release to each other, any medical or other information, including benefits paid or payable, pertinent to this injury or disease, except information relative to diagnosis, treatment and/or counseling for AIDS, psychological conditions, alcohol or controlled substances, for which I must give specific authorization.  A Photostat of this authorization shall be as valid as the original.     Date   Place   Employee’s Signature   THIS REPORT MUST BE COMPLETED AND MAILED WITHIN 3 WORKING DAYS OF TREATMENT   Place  [facility]  Name of Facility  [place]   Date  3/6/2020 Diagnosis  (M25.571) Acute right ankle pain Is there evidence the injured employee was under the influence of alcohol and/or another controlled substance at the time of accident?   Hour  6:19 PM Description of Injury or Disease  The encounter diagnosis was Acute right ankle pain. No   Treatment  nsaids and icing. Wrapped in ace for comfort and support.  Have you advised the patient to remain off work five days or more? No   X-Ray Findings  Negative   If Yes   From Date  To Date      From information given by the employee, together with medical evidence, can you directly connect this injury or occupational disease as job incurred?  Yes If No Full Duty Yes Modified Duty      Is additional medical care by a physician indicated?  Yes If Modified Duty, Specify any Limitations / Restrictions      Do you know of any previous injury or disease contributing to this condition or occupational disease?                            No   Date  3/6/2020 Print Doctor’s Name SHASTA Mosher I  "certify the employer’s copy of  this form was mailed on:   Address  [address] Insurer’s Use Only     [facility]  [address]    Provider’s Tax ID Number  [id number] Telephone  Dept: [phone]        christofer-TONY Livingston   e-Signature: Dr. Andre Coleman,   Medical Director Degree  MD        ORIGINAL-TREATING PHYSICIAN OR CHIROPRACTOR    PAGE 2-INSURER/TPA    PAGE 3-EMPLOYER    PAGE 4-EMPLOYEE             Form C-4 (rev.10/07)              BRIEF DESCRIPTION OF RIGHTS AND BENEFITS  (Pursuant to NRS 616C.050)    Notice of Injury or Occupational Disease (Incident Report Form C-1): If an injury or occupational disease (OD) arises out of and in the course of employment, you must provide written notice to your employer as soon as practicable, but no later than 7 days after the accident or OD. Your employer shall maintain a sufficient supply of the required forms.    Claim for Compensation (Form C-4): If medical treatment is sought, the form C-4 is available at the place of initial treatment. A completed \"Claim for Compensation\" (Form C-4) must be filed within 90 days after an accident or OD. The treating physician or chiropractor must, within 3 working days after treatment, complete and mail to the employer, the employer's insurer and third-party , the Claim for Compensation.    Medical Treatment: If you require medical treatment for your on-the-job injury or OD, you may be required to select a physician or chiropractor from a list provided by your workers’ compensation insurer, if it has contracted with an Organization for Managed Care (MCO) or Preferred Provider Organization (PPO) or providers of health care. If your employer has not entered into a contract with an MCO or PPO, you may select a physician or chiropractor from the Panel of Physicians and Chiropractors. Any medical costs related to your industrial injury or OD will be paid by your insurer.    Temporary Total " Disability (TTD): If your doctor has certified that you are unable to work for a period of at least 5 consecutive days, or 5 cumulative days in a 20-day period, or places restrictions on you that your employer does not accommodate, you may be entitled to TTD compensation.    Temporary Partial Disability (TPD): If the wage you receive upon reemployment is less than the compensation for TTD to which you are entitled, the insurer may be required to pay you TPD compensation to make up the difference. TPD can only be paid for a maximum of 24 months.    Permanent Partial Disability (PPD): When your medical condition is stable and there is an indication of a PPD as a result of your injury or OD, within 30 days, your insurer must arrange for an evaluation by a rating physician or chiropractor to determine the degree of your PPD. The amount of your PPD award depends on the date of injury, the results of the PPD evaluation and your age and wage.    Permanent Total Disability (PTD): If you are medically certified by a treating physician or chiropractor as permanently and totally disabled and have been granted a PTD status by your insurer, you are entitled to receive monthly benefits not to exceed 66 2/3% of your average monthly wage. The amount of your PTD payments is subject to reduction if you previously received a PPD award.    Vocational Rehabilitation Services: You may be eligible for vocational rehabilitation services if you are unable to return to the job due to a permanent physical impairment or permanent restrictions as a result of your injury or occupational disease.    Transportation and Per Mishel Reimbursement: You may be eligible for travel expenses and per mishel associated with medical treatment.    Reopening: You may be able to reopen your claim if your condition worsens after claim closure.    Appeal Process: If you disagree with a written determination issued by the insurer or the insurer does not respond to  your request, you may appeal to the Department of Administration, , by following the instructions contained in your determination letter. You must appeal the determination within 70 days from the date of the determination letter at [address], [address], [place], [place] [address], or [address], [address], [place], [place] [address]. If you disagree with the  decision, you may appeal to the Department of Administration, . You must file your appeal within 30 days from the date of the  decision letter at [address], [address], [place], [place] [address], or [address], [address], [place], [place] [address]. If you disagree with a decision of an , you may file a petition for judicial review with the District Court. You must do so within 30 days of the Appeal Officer’s decision. You may be represented by an  at your own expense or you may contact the [facility] for possible representation.    [place]  for Injured Workers (NAIW): If you disagree with a  decision, you may request that NAIW represent you without charge at an  Hearing. For information regarding denial of benefits, you may contact the [facility] at: [address], [address], [place] [address], [phone], or [address], [address], [place] [address], [phone]    To File a Complaint with the Division: If you wish to file a complaint with the  of the Division of Industrial Relations (DIR),  please contact the Workers’ Compensation Section, [address], [address], [place], [place] [address], telephone [phone]) [phone], or [address], [address], [place], [place] [address], telephone [phone].    For assistance with Workers’ Compensation Issues: You may contact the Office of the Governor Consumer Health Assistance, [address], [address],  [place] [address], Toll Free 1-624.294.9609, Web site: http://govcha.[facility].[place], E-mail [email or web address].                   __________________________________________________________________                                                     _________        Employee Name / Signature                                                                                                                                              Date                                                                                                                                                                                                     D-2 (rev. 06/18)

## 2020-03-06 NOTE — LETTER
"EMPLOYEE’S CLAIM FOR COMPENSATION/ REPORT OF INITIAL TREATMENT  FORM C-4    EMPLOYEE’S CLAIM - PROVIDE ALL INFORMATION REQUESTED   First Name  Salena Last Name  Justin Birthdate                    2000                Sex  female Claim Number   Home Address  [address] Age  19 y.o. 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 [place] [facility]  [address]    Date of Injury  3/6/2020               Hour of Injury  4:50 PM Date Employer Notified  3/6/2020 Last Day of Work after Injury or Occupational Disease  3/6/2020 Supervisor to Whom Injury Reported  [place]   Address or Location of Accident (if applicable)  [[facility]]   What were you doing at the time of accident? (if applicable)  Walking    How did this injury or occupational disease occur? (Be specific an answer in detail. Use additional sheet if necessary)  Walking and fell down the stairs and landed on my right leg and hit both knees on ground    If you believe that you have an occupational disease, when did you first have knowledge of the disability and it relationship to your employment?  N/A Witnesses to the Accident  [place]      Nature of Injury or Occupational Disease  Workers' Compensation  Part(s) of Body Injured or Affected  Lower Leg (R), ,     I certify that the above is true and correct to the best of my knowledge and that I have provided this information in order to obtain the benefits of [place]s [place] " Insurance and Occup[place] Acts ([place] 616A to 616D, inclusive or Chapter 617 of [place]RS).  I hereby authorize any physician, chiropractor, surgeon, practitioner, or other person, any hospital, including [facility] or [facility] hospital, any medical service organization, any insurance company, or other institution or organization to release to each other, any medical or other information, including benefits paid or payable, pertinent to this injury or disease, except information relative to diagnosis, treatment and/or counseling for AIDS, psychological conditions, alcohol or controlled substances, for which I must give specific authorization.  A Photostat of this authorization shall be as valid as the original.     Date   Place   Employee’s Signature   THIS REPORT MUST BE COMPLETED AND MAILED WITHIN 3 WORKING DAYS OF TREATMENT   Place  [facility]  Name of Facility  [place]   Date  3/6/2020 Diagnosis  (S93.401A) Sprain of right ankle, unspecified ligament, initial encounter  (M25.571) Acute right ankle pain Is there evidence the injured employee was under the influence of alcohol and/or another controlled substance at the time of accident?   Hour  6:19 PM Description of Injury or Disease  Diagnoses of Sprain of right ankle, unspecified ligament, initial encounter and Acute right ankle pain were pertinent to this visit. No   Treatment  nsaids and icing. Wrapped in ace for comfort and support.  Have you advised the patient to remain off work five days or more? No   X-Ray Findings  Negative   If Yes   From Date  To Date      From information given by the employee, together with medical evidence, can you directly connect this injury or occupational disease as job incurred?  Yes If No Full Duty Yes Modified Duty      Is additional medical care by a physician indicated?  Yes If Modified Duty, Specify any Limitations / Restrictions      Do you know of any previous injury or disease contributing  "to this condition or occupational disease?                            No   Date  3/6/2020 Print Doctor’s Name SHASTA Mosher I certify the employer’s copy of  this form was mailed on:   Address  [address] Insurer’s Use Only     [facility] Zip  [address]    Provider’s Tax ID Number  [id number] Telephone  Dept: [phone]        e-TONY Livingston   e-Signature: Dr. Andre Coleman,   Medical Director Degree  MD        ORIGINAL-TREATING PHYSICIAN OR CHIROPRACTOR    PAGE 2-INSURER/TPA    PAGE 3-EMPLOYER    PAGE 4-EMPLOYEE             Form C-4 (rev.10/07)              BRIEF DESCRIPTION OF RIGHTS AND BENEFITS  (Pursuant to [place]RS 616C.050)    Notice of Injury or Occupational Disease (Incident Report Form C-1): If an injury or occupational disease (OD) arises out of and in the course of employment, you must provide written notice to your employer as soon as practicable, but no later than 7 days after the accident or OD. Your employer shall maintain a sufficient supply of the required forms.    Claim for Compensation (Form C-4): If medical treatment is sought, the form C-4 is available at the place of initial treatment. A completed \"Claim for Compensation\" (Form C-4) must be filed within 90 days after an accident or OD. The treating physician or chiropractor must, within 3 working days after treatment, complete and mail to the employer, the employer's insurer and third-party , the Claim for Compensation.    Medical Treatment: If you require medical treatment for your on-the-job injury or OD, you may be required to select a physician or chiropractor from a list provided by your workers’ compensation insurer, if it has contracted with an Organization for Managed Care (MCO) or Preferred Provider Organization (PPO) or providers of health care. If your employer has not entered into a contract with an MCO or PPO, you may select a physician or chiropractor from the Panel of " Physicians and Chiropractors. Any medical costs related to your industrial injury or OD will be paid by your insurer.    Temporary Total Disability (TTD): If your doctor has certified that you are unable to work for a period of at least 5 consecutive days, or 5 cumulative days in a 20-day period, or places restrictions on you that your employer does not accommodate, you may be entitled to TTD compensation.    Temporary Partial Disability (TPD): If the wage you receive upon reemployment is less than the compensation for TTD to which you are entitled, the insurer may be required to pay you TPD compensation to make up the difference. TPD can only be paid for a maximum of 24 months.    Permanent Partial Disability (PPD): When your medical condition is stable and there is an indication of a PPD as a result of your injury or OD, within 30 days, your insurer must arrange for an evaluation by a rating physician or chiropractor to determine the degree of your PPD. The amount of your PPD award depends on the date of injury, the results of the PPD evaluation and your age and wage.    Permanent Total Disability (PTD): If you are medically certified by a treating physician or chiropractor as permanently and totally disabled and have been granted a PTD status by your insurer, you are entitled to receive monthly benefits not to exceed 66 2/3% of your average monthly wage. The amount of your PTD payments is subject to reduction if you previously received a PPD award.    Vocational Rehabilitation Services: You may be eligible for vocational rehabilitation services if you are unable to return to the job due to a permanent physical impairment or permanent restrictions as a result of your injury or occupational disease.    Transportation and Per Mishel Reimbursement: You may be eligible for travel expenses and per mishel associated with medical treatment.    Reopening: You may be able to reopen your claim if your condition worsens after  claim closure.    Appeal Process: If you disagree with a written determination issued by the insurer or the insurer does not respond to your request, you may appeal to the Department of Administration, , by following the instructions contained in your determination letter. You must appeal the determination within 70 days from the date of the determination letter at [address], [address], [place], [place] [address], or [address], [place], [place] [address]. If you disagree with the  decision, you may appeal to the Department of Administration, . You must file your appeal within 30 days from the date of the  decision letter at [address], [address], [place], [place] [address], or [address], [place], [place] [address]. If you disagree with a decision of an , you may file a petition for judicial review with the District Court. You must do so within 30 days of the Appeal Officer’s decision. You may be represented by an  at your own expense or you may contact the [facility] for possible representation.    [place]  for Injured Workers ([place]IW): If you disagree with a  decision, you may request that NAIW represent you without charge at an  Hearing. For information regarding denial of benefits, you may contact the [facility] at: [address], [address], [place] [address], [phone]) [phone], or [address], [address], [place] [address], [phone]    To File a Complaint with the Division: If you wish to file a complaint with the  of the Division of Industrial Relations (DIR),  please contact the Workers’ Compensation Section, [address], [address], [place], [place] [address], telephone (722) 496-0090, or [address], [place], [place] [address], telephone ([phone]) [phone].    For assistance with  Workers’ Compensation Issues: You may contact the Office of the Governor Consumer Health Assistance, [address] [place] [address], [address], [place] [address], Toll Free 1-503.163.1600, Web site: http://govcha.[facility].nv., E-mail [email or web address].                   __________________________________________________________________                                                     _________        Employee Name / Signature                                                                                                                                              Date                                                                                                                                                                                                     D-2 (rev. 06/18)

## 2020-03-07 NOTE — PROGRESS NOTES
Subjective:      Salena Anderson is a 19 y.o. female who presents with Fall (fell at workm having right ankle and buttock pain. fell today)        DOI:3/6/20. 19 year old female with right ankle pain after a fall down steps after tripping. She has moderate to severe localized pain to the latera aspect of right ankle. She denies associated foot or knee pain on this side. She has no distal paresthesia or focal weakness. Pain is worse with weight bearing and ambulation. No treatment has been done. No second job and no prior history of injury to this ankle          Current Outpatient Medications on File Prior to Visit   Medication Sig Dispense Refill   • doxycycline monohydrate (ADOXA) 100 MG tablet Take 1 Tab by mouth 2 times a day. (Patient not taking: Reported on 3/6/2020) 14 Tab 0   • ondansetron (ZOFRAN ODT) 4 MG TABLET DISPERSIBLE Take 1 Tab by mouth every 6 hours as needed for Nausea. (Patient not taking: Reported on 2/12/2020) 10 Tab 0   • ondansetron (ZOFRAN ODT) 4 MG TABLET DISPERSIBLE Take 1 Tab by mouth every 6 hours as needed for Nausea. (Patient not taking: Reported on 2/12/2020) 10 Tab 0   • benzonatate (TESSALON) 100 MG Cap Take 1 Cap by mouth 3 times a day as needed for Cough. (Patient not taking: Reported on 7/23/2019) 60 Cap 0     No current facility-administered medications on file prior to visit.      Social History     Socioeconomic History   • Marital status: Single     Spouse name: Not on file   • Number of children: Not on file   • Years of education: Not on file   • Highest education level: Not on file   Occupational History   • Not on file   Social Needs   • Financial resource strain: Not on file   • Food insecurity     Worry: Not on file     Inability: Not on file   • Transportation needs     Medical: Not on file     Non-medical: Not on file   Tobacco Use   • Smoking status: Never Smoker   • Smokeless tobacco: Never Used   Substance and Sexual Activity   • Alcohol use: No   • Drug use: No   •  "Sexual activity: Never   Lifestyle   • Physical activity     Days per week: Not on file     Minutes per session: Not on file   • Stress: Not on file   Relationships   • Social connections     Talks on phone: Not on file     Gets together: Not on file     Attends Protestant service: Not on file     Active member of club or organization: Not on file     Attends meetings of clubs or organizations: Not on file     Relationship status: Not on file   • Intimate partner violence     Fear of current or ex partner: Not on file     Emotionally abused: Not on file     Physically abused: Not on file     Forced sexual activity: Not on file   Other Topics Concern   • Not on file   Social History Narrative   • Not on file     Breast Cancer-related family history is not on file.      Review of Systems   Musculoskeletal: Positive for joint pain and myalgias.   Neurological: Negative for tingling, sensory change and focal weakness.          Objective:     /88   Pulse 77   Temp 37.3 °C (99.2 °F)   Resp 15   Ht 1.702 m (5' 7\")   Wt 102.1 kg (225 lb)   SpO2 99%   BMI 35.24 kg/m²      Physical Exam  Constitutional:       Appearance: Normal appearance.   Cardiovascular:      Rate and Rhythm: Normal rate and regular rhythm.      Heart sounds: No murmur.   Pulmonary:      Effort: Pulmonary effort is normal.      Breath sounds: Normal breath sounds.   Musculoskeletal:      Right ankle: She exhibits decreased range of motion and swelling. She exhibits no ecchymosis, no deformity and no laceration. Tenderness. Lateral malleolus tenderness found. Achilles tendon normal.   Skin:     General: Skin is warm and dry.      Findings: No bruising.   Neurological:      General: No focal deficit present.      Mental Status: She is alert.                 Assessment/Plan:       1. Sprain of right ankle, unspecified ligament, initial encounter     2. Acute right ankle pain  DX-ANKLE 3+ VIEWS RIGHT     Mild sprain.  Wrapped in ace for comfort. "   Full duty  Ibuprofen and icing.  Follow up Tuesday.

## 2020-03-10 ENCOUNTER — OCCUPATIONAL MEDICINE (OUTPATIENT)
Dept: URGENT CARE | Facility: PHYSICIAN GROUP | Age: 20
End: 2020-03-10
Payer: COMMERCIAL

## 2020-03-10 VITALS
HEIGHT: 67 IN | TEMPERATURE: 98.5 F | WEIGHT: 238 LBS | BODY MASS INDEX: 37.35 KG/M2 | DIASTOLIC BLOOD PRESSURE: 68 MMHG | SYSTOLIC BLOOD PRESSURE: 90 MMHG | RESPIRATION RATE: 16 BRPM | HEART RATE: 76 BPM | OXYGEN SATURATION: 98 %

## 2020-03-10 DIAGNOSIS — S93.491D SPRAIN OF ANTERIOR TALOFIBULAR LIGAMENT OF RIGHT ANKLE, SUBSEQUENT ENCOUNTER: ICD-10-CM

## 2020-03-10 PROCEDURE — 99213 OFFICE O/P EST LOW 20 MIN: CPT | Performed by: FAMILY MEDICINE

## 2020-03-10 RX ORDER — IBUPROFEN 200 MG
200 TABLET ORAL EVERY 6 HOURS PRN
Status: ON HOLD | COMMUNITY
End: 2021-08-07

## 2020-03-10 ASSESSMENT — ENCOUNTER SYMPTOMS
SHORTNESS OF BREATH: 0
FEVER: 0
VOMITING: 0
FOCAL WEAKNESS: 0

## 2020-03-10 ASSESSMENT — FIBROSIS 4 INDEX: FIB4 SCORE: 0.24

## 2020-03-10 NOTE — PROGRESS NOTES
"Subjective:     Salena Anderson is a 19 y.o. female who presents for Ankle Pain (w/c fv swelling ankle, right ankle pain )    HPI  Pt presents for follow-up ankle pain   DOI: 3/6/20   JOCELIN: pain started after tripping and falling down stairs   Feels she is making a little improvement   Still has significant pain when trying to ambulate  Pain remains at the lateral ankle  Pain is improved by rest  No numbness or tingling    Review of Systems   Constitutional: Negative for fever.   Respiratory: Negative for shortness of breath.    Cardiovascular: Negative for chest pain.   Gastrointestinal: Negative for vomiting.   Skin: Negative for rash.   Neurological: Negative for focal weakness.     PMH: No pertinent past medical history to this problem  MEDS: Medications were reviewed in Epic  ALLERGIES: Allergies were reviewed in Epic  FH: No pertinent family history to this problem     Objective:   BP (!) 90/68   Pulse 76   Temp 36.9 °C (98.5 °F)   Resp 16   Ht 1.702 m (5' 7\")   Wt 108 kg (238 lb)   SpO2 98%   BMI 37.28 kg/m²     Physical Exam  Constitutional:       General: She is not in acute distress.     Appearance: She is well-developed. She is not diaphoretic.   HENT:      Head: Normocephalic and atraumatic.   Skin:     General: Skin is warm and dry.      Findings: No erythema.   Neurological:      Mental Status: She is alert and oriented to person, place, and time.      Sensory: No sensory deficit.   Psychiatric:         Behavior: Behavior normal.         Thought Content: Thought content normal.         Judgment: Judgment normal.       Right ankle/foot:  Appearance - Mild swelling in lateral ankle   Palpation - +TTP along the lateral ankle and worst along the ATFL   ROM - FROM throughout  Strength - 5/5 throughout  Special testing - Neg squeeze test, neg drawer test  Neurovascular - 2+ dorsalis pedis and posterior tibial.  Sensation intact and equal bilaterally  Gait - antalgic    Assessment/Plan:   Assessment  "   1. Sprain of anterior talofibular ligament of right ankle, subsequent encounter    Patient with right ankle sprain.  Injury 4 days ago and still having difficulties ambulating without significant limp.  Advised that because of this, she should be in a walking boot rather than just ankle wrap/brace.  On x-ray review, patient does not have any definitive fracture, however does have mild haziness at lateral talus which could possibly represent tiny fracture.  First x-ray only 4 days ago and too early to try to repeat x-ray.  If still having significant pain in 1 week, would recommend repeat x-ray of the ankle to ensure no missed fracture.  Patient will remain in walking boot until she follows up in clinic in 1 week.

## 2020-03-10 NOTE — LETTER
[facility] [place]  [address] [place] [address]  Phone:  [phone] - Fax:  [phone]   [facility] Progress Report and Disability Certification  Date of Service: 3/10/2020   No Show:  No  Date / Time of Next Visit: 3/16/2020   Claim Information   Patient Name: Salena Anderson  Claim Number:     Employer:    Date of Injury: 3/6/2020     Insurer / TPA:  ID / SSN:     Occupation: [employer]   Diagnosis: The encounter diagnosis was Sprain of anterior talofibular ligament of right ankle, subsequent encounter.    Medical Information   Related to Industrial Injury? Yes    Subjective Complaints:  Pt presents for follow-up ankle pain   DOI: 3/6/20   JOCELIN: pain started after tripping and falling down stairs   Feels she is making a little improvement   Still has significant pain when trying to ambulate  Pain remains at the lateral ankle  Pain is improved by rest  No numbness or tingling     Objective Findings: Right ankle/foot:  Appearance - Mild swelling in lateral ankle   Palpation - +TTP along the lateral ankle and worst along the ATFL   ROM - FROM throughout  Strength - 5/5 throughout  Special testing - Neg squeeze test, neg drawer test  Neurovascular - 2+ dorsalis pedis and posterior tibial.  Sensation intact and equal bilaterally  Gait - antalgic   Pre-Existing Condition(s):     Assessment:   Condition Same    Status: Additional Care Required  Permanent Disability:No    Plan:      Diagnostics:      Comments:       Disability Information   Status: Released to Restricted Duty    From:  3/10/2020  Through: 3/16/2020 Restrictions are: Temporary   Physical Restrictions   Sitting:    Standing:    Stooping:    Bending:      Squatting:    Walking:    Climbing:    Pushing:      Pulling:    Other:    Reaching Above Shoulder (L):   Reaching Above Shoulder (R):       Reaching Below Shoulder (L):    Reaching Below Shoulder (R):      Not to exceed Weight Limits      Carrying(hrs):   Weight Limit(lb):   Lifting(hrs):   Weight  Limit(lb):     Comments: Patient must wear walking boot at all times while at work    Repetitive Actions   Hands: i.e. Fine Manipulations from Grasping:     Feet: i.e. Operating Foot Controls:     Driving / Operate Machinery:     Physician Name: Ananda Guerrero M.D. Physician Signature:   e-Signature: Dr. Andre Coleman, Medical Director   Clinic Name / Location: [address] [address] Clinic Phone Number: Dept: [phone]   Appointment Time: 8:30 Am Visit Start Time: 8:32 AM   Check-In Time:  8:29 Am Visit Discharge Time:  9:35 AM   Original-Treating Physician or Chiropractor    Page 2-Insurer/TPA    Page 3-Employer    Page 4-Employee

## 2020-03-16 ENCOUNTER — OCCUPATIONAL MEDICINE (OUTPATIENT)
Dept: URGENT CARE | Facility: PHYSICIAN GROUP | Age: 20
End: 2020-03-16
Payer: COMMERCIAL

## 2020-03-16 ENCOUNTER — HOSPITAL ENCOUNTER (OUTPATIENT)
Dept: RADIOLOGY | Facility: MEDICAL CENTER | Age: 20
End: 2020-03-16
Attending: PHYSICIAN ASSISTANT
Payer: COMMERCIAL

## 2020-03-16 VITALS
DIASTOLIC BLOOD PRESSURE: 72 MMHG | TEMPERATURE: 98.3 F | WEIGHT: 238 LBS | OXYGEN SATURATION: 99 % | SYSTOLIC BLOOD PRESSURE: 110 MMHG | BODY MASS INDEX: 37.35 KG/M2 | HEIGHT: 67 IN | HEART RATE: 72 BPM

## 2020-03-16 DIAGNOSIS — S93.401D SPRAIN OF RIGHT ANKLE, UNSPECIFIED LIGAMENT, SUBSEQUENT ENCOUNTER: ICD-10-CM

## 2020-03-16 PROCEDURE — 73610 X-RAY EXAM OF ANKLE: CPT | Mod: RT

## 2020-03-16 PROCEDURE — 99214 OFFICE O/P EST MOD 30 MIN: CPT | Performed by: PHYSICIAN ASSISTANT

## 2020-03-16 ASSESSMENT — ENCOUNTER SYMPTOMS
FALLS: 1
TINGLING: 0
SENSORY CHANGE: 0

## 2020-03-16 ASSESSMENT — FIBROSIS 4 INDEX: FIB4 SCORE: 0.24

## 2020-03-16 ASSESSMENT — PAIN SCALES - GENERAL: PAINLEVEL: 6=MODERATE PAIN

## 2020-03-16 NOTE — LETTER
[facility] [place] [address] [facility] [place] [address]  Phone:  [phone] - Fax:  [phone]   [facility] Progress Report and Disability Certification  Date of Service: 3/16/2020   No Show:  No  Date / Time of Next Visit: 3/26/2020   Claim Information   Patient Name: Salena Anderson  Claim Number:     Employer:  [employer] Date of Injury: 3/6/2020     Insurer / TPA: [facility]  ID / SSN:     Occupation: [employer]   Diagnosis: The encounter diagnosis was Sprain of right ankle, unspecified ligament, subsequent encounter.    Medical Information   Related to Industrial Injury? Yes    Subjective Complaints:   DOI:3/6/20. Visit #3-patient does report in general the pain has improved after utilization of the boot however continues with lateral ankle pain.  Patient reports the swelling has improved as well.  She has been conducting light duty however if she is without the boot then she reports immediate swelling to the outside portion of the ankle.  Injury- copied from original visit- 19 year old female with right ankle pain after a fall down steps after tripping. She has moderate to severe localized pain to the latera aspect of right ankle.   Objective Findings: Ankle : Without deformity, edema, or calluses.  Notable tenderness continue to the lateral malleolus along with the ATF ligament  Without tenderness over the medial malleolus, Achilles tendon, bony prominences of the foot, or metatarsals. Negative anterior drawer. DPF intact and symmetrical.  Dorsalis pedis 2+. Skin intact.      Pre-Existing Condition(s): None known.   Assessment:   Condition Improved    Status: Additional Care Required  Permanent Disability:No    Plan: Medication    Diagnostics:   Comments:Repeat x-ray today continues to be without fracture or other bony abnormality.    Comments:       Disability Information   Status: Released to Restricted Duty    From:  3/16/2020  Through: 3/26/2020  Restrictions are: Temporary   Physical Restrictions   Sitting:    Standing:    Stooping:    Bending:      Squatting:    Walking:    Climbing:    Pushing:      Pulling:    Other:    Reaching Above Shoulder (L):   Reaching Above Shoulder (R):       Reaching Below Shoulder (L):    Reaching Below Shoulder (R):      Not to exceed Weight Limits   Carrying(hrs):   Weight Limit(lb):   Lifting(hrs):   Weight  Limit(lb):     Comments: Continue to wear the boot while at work for long periods of standing or walking.  Light exercise discussed.  Continue with ice and NSAIDs only if needed.  Return to clinic in 10 days for further recheck.    Repetitive Actions   Hands: i.e. Fine Manipulations from Grasping:     Feet: i.e. Operating Foot Controls:     Driving / Operate Machinery:     Physician Name: Humberto Goetz P.A.-C. Physician Signature: HUMBERTO Pendleton P.A.-C. e-Signature: Dr. Andre Coleman, Medical Director   Clinic Name / Location: [address] [address] Clinic Phone Number: Dept: [phone]   Appointment Time: 9:30 Am Visit Start Time: 9:33 AM   Check-In Time:  9:17 Am Visit Discharge Time:  11:01 AM   Original-Treating Physician or Chiropractor    Page 2-Insurer/TPA    Page 3-Employer    Page 4-Employee

## 2020-03-16 NOTE — PROGRESS NOTES
"Subjective:      Salena Anderson is a 19 y.o. female who presents with Ankle Pain (ankle pain wc fv stiffness)       DOI:3/6/20. Visit #3-patient does report in general the pain has improved after utilization of the boot however continues with lateral ankle pain.  Patient reports the swelling has improved as well.  She has been conducting light duty however if she is without the boot then she reports immediate swelling to the outside portion of the ankle.  Injury- copied from original visit- 19 year old female with right ankle pain after a fall down steps after tripping. She has moderate to severe localized pain to the latera aspect of right ankle.     Ankle Pain    Incident onset: 3/6. The incident occurred at work. The injury mechanism was a fall. Pain location: Right ankle. Pertinent negatives include no tingling. She reports no foreign bodies present. The symptoms are aggravated by movement, palpation and weight bearing. She has tried ice, immobilization and NSAIDs for the symptoms. The treatment provided mild relief.       Review of Systems   Musculoskeletal: Positive for falls and joint pain.   Neurological: Negative for tingling and sensory change.   All other systems reviewed and are negative.         Objective:     /72   Pulse 72   Temp 36.8 °C (98.3 °F)   Ht 1.702 m (5' 7\")   Wt 108 kg (238 lb)   SpO2 99%   BMI 37.28 kg/m²      PMH: No pertinent past medical history to this problem  MEDS: Medications were reviewed in Epic  ALLERGIES: Allergies were reviewed in Epic  SOCHX: Works in [employer] service  FH: No pertinent family history to this problem    Physical Exam  Vitals signs reviewed.   Constitutional:       General: She is not in acute distress.     Appearance: She is well-developed.   HENT:      Head: Normocephalic and atraumatic.   Eyes:      Conjunctiva/sclera: Conjunctivae normal.      Pupils: Pupils are equal, round, and reactive to light.   Neck:      Musculoskeletal: Normal range of " motion and neck supple.      Trachea: No tracheal deviation.   Cardiovascular:      Rate and Rhythm: Normal rate.   Pulmonary:      Effort: Pulmonary effort is normal. No respiratory distress.   Skin:     General: Skin is warm.      Findings: No rash.   Neurological:      Mental Status: She is alert and oriented to person, place, and time.   Psychiatric:         Behavior: Behavior normal.         Thought Content: Thought content normal.         Judgment: Judgment normal.         Ankle : Without deformity, edema, or calluses.  Notable tenderness continue to the lateral malleolus along with the ATF ligament  Without tenderness over the medial malleolus, Achilles tendon, bony prominences of the foot, or metatarsals. Negative anterior drawer. DPF intact and symmetrical.  Dorsalis pedis 2+. Skin intact.          Assessment/Plan:       1. Sprain of right ankle, unspecified ligament, subsequent encounter    Re-x-ray continues to be normal at this time.  She is to continue with walking boot at this time with long periods of standing, walking.  She is currently on spring break at this time-she will return to clinic in 10 days for recheck anticipate full duty or discharge at that time.  Continue with ice, NSAIDs and light stretching as discussed.

## 2020-03-26 ENCOUNTER — OCCUPATIONAL MEDICINE (OUTPATIENT)
Dept: URGENT CARE | Facility: PHYSICIAN GROUP | Age: 20
End: 2020-03-26
Payer: COMMERCIAL

## 2020-03-26 VITALS
HEART RATE: 82 BPM | BODY MASS INDEX: 33.34 KG/M2 | SYSTOLIC BLOOD PRESSURE: 112 MMHG | HEIGHT: 68 IN | RESPIRATION RATE: 20 BRPM | OXYGEN SATURATION: 98 % | TEMPERATURE: 97.6 F | WEIGHT: 220 LBS | DIASTOLIC BLOOD PRESSURE: 80 MMHG

## 2020-03-26 DIAGNOSIS — S93.401D SPRAIN OF RIGHT ANKLE, UNSPECIFIED LIGAMENT, SUBSEQUENT ENCOUNTER: ICD-10-CM

## 2020-03-26 PROCEDURE — 99213 OFFICE O/P EST LOW 20 MIN: CPT | Mod: 29 | Performed by: NURSE PRACTITIONER

## 2020-03-26 ASSESSMENT — ENCOUNTER SYMPTOMS
FOCAL WEAKNESS: 0
SENSORY CHANGE: 0
MYALGIAS: 0
TINGLING: 0

## 2020-03-26 ASSESSMENT — FIBROSIS 4 INDEX: FIB4 SCORE: 0.24

## 2020-03-26 NOTE — LETTER
[facility] [place]  [address]  [place] [address]  Phone:  [phone] - Fax:  [phone]   [facility] Progress Report and Disability Certification  Date of Service: 3/26/2020   No Show:  No  Date / Time of Next Visit:     Claim Information   Patient Name: Salena Anderson  Claim Number:     Employer:  [employer]  Date of Injury: 3/6/2020     Insurer / TPA: Mar[employer]  ID / SSN:     Occupation: [employer]   Diagnosis: The encounter diagnosis was Sprain of right ankle, unspecified ligament, subsequent encounter.    Medical Information   Related to Industrial Injury? Yes    Subjective Complaints:  DOI: 3/6/20. Patient is 19 year old female here for follow up on ankle sprain after a fall down stairs at work. This is her 4th visit with us. She reports no pain today, just some occasional popping. No longer wearing boot. Doing well. No medications. Just continuing home exercises.   Objective Findings: Physical Exam  Constitutional:       Appearance: Normal appearance.   Musculoskeletal: Normal range of motion.         General: No swelling.      Right ankle: She exhibits normal range of motion, no swelling, no ecchymosis and no deformity. No tenderness. No lateral malleolus tenderness found.   Skin:     General: Skin is warm and dry.      Findings: No bruising or erythema.   Neurological:      General: No focal deficit present.      Mental Status: She is alert.        Pre-Existing Condition(s):     Assessment:   Condition Improved    Status: Discharged /  MMI  Permanent Disability:No    Plan:      Diagnostics:      Comments:       Disability Information   Status: Released to Full Duty    From:     Through:   Restrictions are:     Physical Restrictions   Sitting:    Standing:    Stooping:    Bending:      Squatting:    Walking:    Climbing:    Pushing:      Pulling:    Other:    Reaching Above Shoulder (L):   Reaching Above Shoulder (R):       Reaching Below  Shoulder (L):    Reaching Below Shoulder (R):      Not to exceed Weight Limits   Carrying(hrs):   Weight Limit(lb):   Lifting(hrs):   Weight  Limit(lb):     Comments:      Repetitive Actions   Hands: i.e. Fine Manipulations from Grasping:     Feet: i.e. Operating Foot Controls:     Driving / Operate Machinery:     Physician Name: SHASTA Mosher Physician Signature: TONY Bustillo e-Signature: Dr. Andre Coleman, Medical Director   Clinic Name / Location: [address] [address] Clinic Phone Number: Dept: [phone]   Appointment Time: 6:00 Pm Visit Start Time: 6:13 PM   Check-In Time:  6:05 Pm Visit Discharge Time:  6: 25 PM    Original-Treating Physician or Chiropractor    Page 2-Insurer/TPA    Page 3-Employer    Page 4-Employee

## 2020-03-27 NOTE — PROGRESS NOTES
Subjective:      Salena Anderson is a 19 y.o. female who presents with Ankle Injury (Wc DOI 3/13/2020, R ankle injury, PT states its getting better, pops some times)      DOI: 3/6/20. Patient is 19 year old female here for follow up on ankle sprain after a fall down stairs at work. This is her 4th visit with us.   She reports no pain today, just some occasional popping. No longer wearing boot. Doing well. No medications. Just continuing home exercises.    HPI     Patient has no known allergies.  Current Outpatient Medications on File Prior to Visit   Medication Sig Dispense Refill   • Acetaminophen (TYLENOL 8 HOUR PO) Take  by mouth.     • ibuprofen (MOTRIN) 200 MG Tab Take 200 mg by mouth every 6 hours as needed.     • doxycycline monohydrate (ADOXA) 100 MG tablet Take 1 Tab by mouth 2 times a day. (Patient not taking: Reported on 3/6/2020) 14 Tab 0   • ondansetron (ZOFRAN ODT) 4 MG TABLET DISPERSIBLE Take 1 Tab by mouth every 6 hours as needed for Nausea. (Patient not taking: Reported on 2/12/2020) 10 Tab 0   • ondansetron (ZOFRAN ODT) 4 MG TABLET DISPERSIBLE Take 1 Tab by mouth every 6 hours as needed for Nausea. (Patient not taking: Reported on 2/12/2020) 10 Tab 0   • benzonatate (TESSALON) 100 MG Cap Take 1 Cap by mouth 3 times a day as needed for Cough. (Patient not taking: Reported on 7/23/2019) 60 Cap 0     No current facility-administered medications on file prior to visit.      Social History     Socioeconomic History   • Marital status: Single     Spouse name: Not on file   • Number of children: Not on file   • Years of education: Not on file   • Highest education level: Not on file   Occupational History   • Not on file   Social Needs   • Financial resource strain: Not on file   • Food insecurity     Worry: Not on file     Inability: Not on file   • Transportation needs     Medical: Not on file     Non-medical: Not on file   Tobacco Use   • Smoking status: Never Smoker   • Smokeless tobacco: Never Used  "  Substance and Sexual Activity   • Alcohol use: No   • Drug use: No   • Sexual activity: Never   Lifestyle   • Physical activity     Days per week: Not on file     Minutes per session: Not on file   • Stress: Not on file   Relationships   • Social connections     Talks on phone: Not on file     Gets together: Not on file     Attends [employer] service: Not on file     Active member of club or organization: Not on file     Attends meetings of clubs or organizations: Not on file     Relationship status: Not on file   • Intimate partner violence     Fear of current or ex partner: Not on file     Emotionally abused: Not on file     Physically abused: Not on file     Forced sexual activity: Not on file   Other Topics Concern   • Not on file   Social History Narrative   • Not on file     Breast Cancer-related family history is not on file.      Review of Systems   Musculoskeletal: Negative for joint pain and myalgias.   Neurological: Negative for tingling, sensory change and focal weakness.          Objective:     /80   Pulse 82   Temp 36.4 °C (97.6 °F) (Temporal)   Resp 20   Ht 1.727 m (5' 8\")   Wt 99.8 kg (220 lb)   SpO2 98%   BMI 33.45 kg/m²      Physical Exam  Constitutional:       Appearance: Normal appearance.   Musculoskeletal: Normal range of motion.         General: No swelling.      Right ankle: She exhibits normal range of motion, no swelling, no ecchymosis and no deformity. No tenderness. No lateral malleolus tenderness found.   Skin:     General: Skin is warm and dry.      Findings: No bruising or erythema.   Neurological:      General: No focal deficit present.      Mental Status: She is alert.                 Assessment/Plan:       1. Sprain of right ankle, unspecified ligament, subsequent encounter       MMI.  Follow up as needed.  "

## 2020-11-19 ENCOUNTER — HOSPITAL ENCOUNTER (OUTPATIENT)
Dept: LAB | Facility: MEDICAL CENTER | Age: 20
End: 2020-11-19
Attending: OBSTETRICS & GYNECOLOGY
Payer: COMMERCIAL

## 2020-11-19 LAB — B-HCG SERPL-ACNC: <1 MIU/ML (ref 0–5)

## 2020-11-19 PROCEDURE — 84702 CHORIONIC GONADOTROPIN TEST: CPT

## 2020-11-19 PROCEDURE — 36415 COLL VENOUS BLD VENIPUNCTURE: CPT

## 2020-12-17 ENCOUNTER — HOSPITAL ENCOUNTER (OUTPATIENT)
Dept: LAB | Facility: MEDICAL CENTER | Age: 20
End: 2020-12-17
Attending: OBSTETRICS & GYNECOLOGY
Payer: COMMERCIAL

## 2020-12-17 LAB
ABO GROUP BLD: NORMAL
B-HCG SERPL-ACNC: 4167 MIU/ML (ref 0–5)
PROGEST SERPL-MCNC: 21.1 NG/ML
RH BLD: NORMAL

## 2020-12-17 PROCEDURE — 36415 COLL VENOUS BLD VENIPUNCTURE: CPT

## 2020-12-17 PROCEDURE — 84702 CHORIONIC GONADOTROPIN TEST: CPT

## 2020-12-17 PROCEDURE — 86900 BLOOD TYPING SEROLOGIC ABO: CPT

## 2020-12-17 PROCEDURE — 86901 BLOOD TYPING SEROLOGIC RH(D): CPT

## 2020-12-17 PROCEDURE — 84144 ASSAY OF PROGESTERONE: CPT

## 2020-12-23 ENCOUNTER — HOSPITAL ENCOUNTER (OUTPATIENT)
Dept: LAB | Facility: MEDICAL CENTER | Age: 20
End: 2020-12-23
Attending: OBSTETRICS & GYNECOLOGY
Payer: COMMERCIAL

## 2020-12-23 LAB — B-HCG SERPL-ACNC: ABNORMAL MIU/ML (ref 0–5)

## 2020-12-23 PROCEDURE — 36415 COLL VENOUS BLD VENIPUNCTURE: CPT

## 2020-12-23 PROCEDURE — 84702 CHORIONIC GONADOTROPIN TEST: CPT

## 2021-01-18 ENCOUNTER — HOSPITAL ENCOUNTER (OUTPATIENT)
Dept: LAB | Facility: MEDICAL CENTER | Age: 21
End: 2021-01-18
Attending: OBSTETRICS & GYNECOLOGY
Payer: COMMERCIAL

## 2021-01-18 LAB
ABO GROUP BLD: NORMAL
AMORPH CRY #/AREA URNS HPF: PRESENT /HPF
APPEARANCE UR: ABNORMAL
BACTERIA #/AREA URNS HPF: ABNORMAL /HPF
BASOPHILS # BLD AUTO: 0.4 % (ref 0–1.8)
BASOPHILS # BLD: 0.03 K/UL (ref 0–0.12)
BILIRUB UR QL STRIP.AUTO: NEGATIVE
BLD GP AB SCN SERPL QL: NORMAL
COLOR UR: YELLOW
EOSINOPHIL # BLD AUTO: 0.03 K/UL (ref 0–0.51)
EOSINOPHIL NFR BLD: 0.4 % (ref 0–6.9)
EPI CELLS #/AREA URNS HPF: ABNORMAL /HPF
ERYTHROCYTE [DISTWIDTH] IN BLOOD BY AUTOMATED COUNT: 38.4 FL (ref 35.9–50)
GLUCOSE UR STRIP.AUTO-MCNC: NEGATIVE MG/DL
HBV SURFACE AG SER QL: ABNORMAL
HCT VFR BLD AUTO: 42.2 % (ref 37–47)
HGB BLD-MCNC: 14.2 G/DL (ref 12–16)
HIV 1+2 AB+HIV1 P24 AG SERPL QL IA: NORMAL
HYALINE CASTS #/AREA URNS LPF: ABNORMAL /LPF
IMM GRANULOCYTES # BLD AUTO: 0.03 K/UL (ref 0–0.11)
IMM GRANULOCYTES NFR BLD AUTO: 0.4 % (ref 0–0.9)
KETONES UR STRIP.AUTO-MCNC: NEGATIVE MG/DL
LEUKOCYTE ESTERASE UR QL STRIP.AUTO: ABNORMAL
LYMPHOCYTES # BLD AUTO: 1.26 K/UL (ref 1–4.8)
LYMPHOCYTES NFR BLD: 15.3 % (ref 22–41)
MCH RBC QN AUTO: 29.3 PG (ref 27–33)
MCHC RBC AUTO-ENTMCNC: 33.6 G/DL (ref 33.6–35)
MCV RBC AUTO: 87.2 FL (ref 81.4–97.8)
MICRO URNS: ABNORMAL
MONOCYTES # BLD AUTO: 0.53 K/UL (ref 0–0.85)
MONOCYTES NFR BLD AUTO: 6.4 % (ref 0–13.4)
NEUTROPHILS # BLD AUTO: 6.34 K/UL (ref 2–7.15)
NEUTROPHILS NFR BLD: 77.1 % (ref 44–72)
NITRITE UR QL STRIP.AUTO: NEGATIVE
NRBC # BLD AUTO: 0 K/UL
NRBC BLD-RTO: 0 /100 WBC
PH UR STRIP.AUTO: 8.5 [PH] (ref 5–8)
PLATELET # BLD AUTO: 283 K/UL (ref 164–446)
PMV BLD AUTO: 11.5 FL (ref 9–12.9)
PROT UR QL STRIP: 30 MG/DL
RBC # BLD AUTO: 4.84 M/UL (ref 4.2–5.4)
RBC # URNS HPF: ABNORMAL /HPF
RBC UR QL AUTO: NEGATIVE
RH BLD: NORMAL
RUBV AB SER QL: 45.9 IU/ML
SP GR UR STRIP.AUTO: 1.02
TREPONEMA PALLIDUM IGG+IGM AB [PRESENCE] IN SERUM OR PLASMA BY IMMUNOASSAY: ABNORMAL
UROBILINOGEN UR STRIP.AUTO-MCNC: 1 MG/DL
WBC # BLD AUTO: 8.2 K/UL (ref 4.8–10.8)
WBC #/AREA URNS HPF: ABNORMAL /HPF

## 2021-01-18 PROCEDURE — 86780 TREPONEMA PALLIDUM: CPT

## 2021-01-18 PROCEDURE — 81001 URINALYSIS AUTO W/SCOPE: CPT

## 2021-01-18 PROCEDURE — 86900 BLOOD TYPING SEROLOGIC ABO: CPT

## 2021-01-18 PROCEDURE — 86850 RBC ANTIBODY SCREEN: CPT

## 2021-01-18 PROCEDURE — 86762 RUBELLA ANTIBODY: CPT

## 2021-01-18 PROCEDURE — 87086 URINE CULTURE/COLONY COUNT: CPT

## 2021-01-18 PROCEDURE — 81244 FMR1 GEN ALYS CHARAC ALLELES: CPT

## 2021-01-18 PROCEDURE — 81243 FMR1 GEN ALY DETC ABNL ALLEL: CPT

## 2021-01-18 PROCEDURE — 36415 COLL VENOUS BLD VENIPUNCTURE: CPT

## 2021-01-18 PROCEDURE — 81220 CFTR GENE COM VARIANTS: CPT

## 2021-01-18 PROCEDURE — 86901 BLOOD TYPING SEROLOGIC RH(D): CPT

## 2021-01-18 PROCEDURE — 87389 HIV-1 AG W/HIV-1&-2 AB AG IA: CPT

## 2021-01-18 PROCEDURE — 85025 COMPLETE CBC W/AUTO DIFF WBC: CPT

## 2021-01-18 PROCEDURE — 81401 MOPATH PROCEDURE LEVEL 2: CPT

## 2021-01-18 PROCEDURE — 87340 HEPATITIS B SURFACE AG IA: CPT

## 2021-01-21 LAB
BACTERIA UR CULT: NORMAL
SIGNIFICANT IND 70042: NORMAL
SITE SITE: NORMAL
SOURCE SOURCE: NORMAL

## 2021-01-28 LAB
CF EXPANDED VARIANT PANEL INTERP Q4864: NORMAL
CFTR ALLELE 1 BLD/T QL: NEGATIVE
CFTR ALLELE 1 BLD/T QL: NEGATIVE
CFTR MUT ANL BLD/T: NORMAL
FMR1 ALLELE 2 CGG RPT ENTNUM BLD/T: 29 CGG REPEATS
FMR1 ALLELE1 CGG RPT ENTNUM BLD/T: 30 CGG REPEATS
FMR1 GENE MUT ANL BLD/T: NORMAL
FMR1 GENE MUT ANL BLD/T: NORMAL

## 2021-02-01 LAB
GEN STRUCT VAR COPY NUM: NORMAL
SMN1 GENE MUT ANL BLD/T: NORMAL
SMN1+SMN2 GENE MUT ANL BLD/T: NORMAL
SMN2 GENE MUT ANL BLD/T: NORMAL
SPECIMEN SOURCE: NORMAL
SYMPTOM: NO

## 2021-02-12 ENCOUNTER — HOSPITAL ENCOUNTER (OUTPATIENT)
Facility: MEDICAL CENTER | Age: 21
End: 2021-02-12
Attending: OBSTETRICS & GYNECOLOGY
Payer: COMMERCIAL

## 2021-02-12 PROCEDURE — 87491 CHLMYD TRACH DNA AMP PROBE: CPT

## 2021-02-12 PROCEDURE — 87086 URINE CULTURE/COLONY COUNT: CPT

## 2021-02-12 PROCEDURE — 88175 CYTOPATH C/V AUTO FLUID REDO: CPT

## 2021-02-12 PROCEDURE — 87591 N.GONORRHOEAE DNA AMP PROB: CPT

## 2021-02-13 LAB — AMBIGUOUS DTTM AMBI4: NORMAL

## 2021-02-15 LAB
BACTERIA UR CULT: NORMAL
SIGNIFICANT IND 70042: NORMAL
SITE SITE: NORMAL
SOURCE SOURCE: NORMAL

## 2021-02-17 LAB
C TRACH DNA GENITAL QL NAA+PROBE: NEGATIVE
CYTOLOGY REG CYTOL: NORMAL
N GONORRHOEA DNA GENITAL QL NAA+PROBE: NEGATIVE
SPECIMEN SOURCE: NORMAL

## 2021-04-16 ENCOUNTER — HOSPITAL ENCOUNTER (OUTPATIENT)
Dept: LAB | Facility: MEDICAL CENTER | Age: 21
End: 2021-04-16
Attending: OBSTETRICS & GYNECOLOGY
Payer: COMMERCIAL

## 2021-04-16 LAB
ALBUMIN SERPL BCP-MCNC: 3.8 G/DL (ref 3.2–4.9)
ALBUMIN/GLOB SERPL: 1.2 G/DL
ALP SERPL-CCNC: 53 U/L (ref 30–99)
ALT SERPL-CCNC: 11 U/L (ref 2–50)
ANION GAP SERPL CALC-SCNC: 12 MMOL/L (ref 7–16)
AST SERPL-CCNC: 10 U/L (ref 12–45)
BASOPHILS # BLD AUTO: 0.3 % (ref 0–1.8)
BASOPHILS # BLD: 0.04 K/UL (ref 0–0.12)
BILIRUB SERPL-MCNC: 0.3 MG/DL (ref 0.1–1.5)
BUN SERPL-MCNC: 2 MG/DL (ref 8–22)
CALCIUM SERPL-MCNC: 9.3 MG/DL (ref 8.5–10.5)
CHLORIDE SERPL-SCNC: 102 MMOL/L (ref 96–112)
CO2 SERPL-SCNC: 23 MMOL/L (ref 20–33)
CREAT SERPL-MCNC: 0.36 MG/DL (ref 0.5–1.4)
CREAT UR-MCNC: 115.06 MG/DL
EOSINOPHIL # BLD AUTO: 0.05 K/UL (ref 0–0.51)
EOSINOPHIL NFR BLD: 0.4 % (ref 0–6.9)
ERYTHROCYTE [DISTWIDTH] IN BLOOD BY AUTOMATED COUNT: 41.2 FL (ref 35.9–50)
FASTING STATUS PATIENT QL REPORTED: NORMAL
GLOBULIN SER CALC-MCNC: 3.1 G/DL (ref 1.9–3.5)
GLUCOSE SERPL-MCNC: 108 MG/DL (ref 65–99)
HCT VFR BLD AUTO: 40.1 % (ref 37–47)
HGB BLD-MCNC: 13.6 G/DL (ref 12–16)
IMM GRANULOCYTES # BLD AUTO: 0.06 K/UL (ref 0–0.11)
IMM GRANULOCYTES NFR BLD AUTO: 0.5 % (ref 0–0.9)
LYMPHOCYTES # BLD AUTO: 1.42 K/UL (ref 1–4.8)
LYMPHOCYTES NFR BLD: 12.3 % (ref 22–41)
MCH RBC QN AUTO: 30.4 PG (ref 27–33)
MCHC RBC AUTO-ENTMCNC: 33.9 G/DL (ref 33.6–35)
MCV RBC AUTO: 89.5 FL (ref 81.4–97.8)
MONOCYTES # BLD AUTO: 0.43 K/UL (ref 0–0.85)
MONOCYTES NFR BLD AUTO: 3.7 % (ref 0–13.4)
NEUTROPHILS # BLD AUTO: 9.5 K/UL (ref 2–7.15)
NEUTROPHILS NFR BLD: 82.8 % (ref 44–72)
NRBC # BLD AUTO: 0 K/UL
NRBC BLD-RTO: 0 /100 WBC
PLATELET # BLD AUTO: 233 K/UL (ref 164–446)
PMV BLD AUTO: 11.5 FL (ref 9–12.9)
POTASSIUM SERPL-SCNC: 3.7 MMOL/L (ref 3.6–5.5)
PROT SERPL-MCNC: 6.9 G/DL (ref 6–8.2)
PROT UR-MCNC: 12 MG/DL (ref 0–15)
PROT/CREAT UR: 104 MG/G (ref 10–107)
RBC # BLD AUTO: 4.48 M/UL (ref 4.2–5.4)
SODIUM SERPL-SCNC: 137 MMOL/L (ref 135–145)
URATE SERPL-MCNC: 4.5 MG/DL (ref 1.9–8.2)
WBC # BLD AUTO: 11.5 K/UL (ref 4.8–10.8)

## 2021-04-16 PROCEDURE — 36415 COLL VENOUS BLD VENIPUNCTURE: CPT

## 2021-04-16 PROCEDURE — 84156 ASSAY OF PROTEIN URINE: CPT

## 2021-04-16 PROCEDURE — 84550 ASSAY OF BLOOD/URIC ACID: CPT

## 2021-04-16 PROCEDURE — 85025 COMPLETE CBC W/AUTO DIFF WBC: CPT

## 2021-04-16 PROCEDURE — 82570 ASSAY OF URINE CREATININE: CPT

## 2021-04-16 PROCEDURE — 80053 COMPREHEN METABOLIC PANEL: CPT

## 2021-05-18 ENCOUNTER — HOSPITAL ENCOUNTER (OUTPATIENT)
Dept: LAB | Facility: MEDICAL CENTER | Age: 21
End: 2021-05-18
Attending: OBSTETRICS & GYNECOLOGY
Payer: COMMERCIAL

## 2021-05-18 LAB
ALBUMIN SERPL BCP-MCNC: 3.8 G/DL (ref 3.2–4.9)
ALBUMIN/GLOB SERPL: 1.2 G/DL
ALP SERPL-CCNC: 61 U/L (ref 30–99)
ALT SERPL-CCNC: 9 U/L (ref 2–50)
ANION GAP SERPL CALC-SCNC: 11 MMOL/L (ref 7–16)
AST SERPL-CCNC: 15 U/L (ref 12–45)
BASOPHILS # BLD AUTO: 0.3 % (ref 0–1.8)
BASOPHILS # BLD: 0.03 K/UL (ref 0–0.12)
BILIRUB SERPL-MCNC: 0.3 MG/DL (ref 0.1–1.5)
BUN SERPL-MCNC: 7 MG/DL (ref 8–22)
CALCIUM SERPL-MCNC: 9.5 MG/DL (ref 8.5–10.5)
CHLORIDE SERPL-SCNC: 105 MMOL/L (ref 96–112)
CO2 SERPL-SCNC: 21 MMOL/L (ref 20–33)
CREAT SERPL-MCNC: 0.34 MG/DL (ref 0.5–1.4)
CREAT UR-MCNC: 136.51 MG/DL
EOSINOPHIL # BLD AUTO: 0.06 K/UL (ref 0–0.51)
EOSINOPHIL NFR BLD: 0.6 % (ref 0–6.9)
ERYTHROCYTE [DISTWIDTH] IN BLOOD BY AUTOMATED COUNT: 41 FL (ref 35.9–50)
GLOBULIN SER CALC-MCNC: 3.2 G/DL (ref 1.9–3.5)
GLUCOSE 1H P 50 G GLC PO SERPL-MCNC: 171 MG/DL (ref 70–139)
GLUCOSE SERPL-MCNC: 87 MG/DL (ref 65–99)
HCT VFR BLD AUTO: 40.4 % (ref 37–47)
HGB BLD-MCNC: 13.6 G/DL (ref 12–16)
IMM GRANULOCYTES # BLD AUTO: 0.06 K/UL (ref 0–0.11)
IMM GRANULOCYTES NFR BLD AUTO: 0.6 % (ref 0–0.9)
LYMPHOCYTES # BLD AUTO: 1.56 K/UL (ref 1–4.8)
LYMPHOCYTES NFR BLD: 15.1 % (ref 22–41)
MCH RBC QN AUTO: 29.8 PG (ref 27–33)
MCHC RBC AUTO-ENTMCNC: 33.7 G/DL (ref 33.6–35)
MCV RBC AUTO: 88.6 FL (ref 81.4–97.8)
MONOCYTES # BLD AUTO: 0.66 K/UL (ref 0–0.85)
MONOCYTES NFR BLD AUTO: 6.4 % (ref 0–13.4)
NEUTROPHILS # BLD AUTO: 7.93 K/UL (ref 2–7.15)
NEUTROPHILS NFR BLD: 77 % (ref 44–72)
NRBC # BLD AUTO: 0 K/UL
NRBC BLD-RTO: 0 /100 WBC
PLATELET # BLD AUTO: 232 K/UL (ref 164–446)
PMV BLD AUTO: 11.7 FL (ref 9–12.9)
POTASSIUM SERPL-SCNC: 4 MMOL/L (ref 3.6–5.5)
PROT SERPL-MCNC: 7 G/DL (ref 6–8.2)
PROT UR-MCNC: 15 MG/DL (ref 0–15)
PROT/CREAT UR: 110 MG/G (ref 10–107)
RBC # BLD AUTO: 4.56 M/UL (ref 4.2–5.4)
SODIUM SERPL-SCNC: 137 MMOL/L (ref 135–145)
TREPONEMA PALLIDUM IGG+IGM AB [PRESENCE] IN SERUM OR PLASMA BY IMMUNOASSAY: NORMAL
URATE SERPL-MCNC: 4.9 MG/DL (ref 1.9–8.2)
WBC # BLD AUTO: 10.3 K/UL (ref 4.8–10.8)

## 2021-05-18 PROCEDURE — 80053 COMPREHEN METABOLIC PANEL: CPT

## 2021-05-18 PROCEDURE — 85025 COMPLETE CBC W/AUTO DIFF WBC: CPT

## 2021-05-18 PROCEDURE — 84156 ASSAY OF PROTEIN URINE: CPT

## 2021-05-18 PROCEDURE — 82950 GLUCOSE TEST: CPT

## 2021-05-18 PROCEDURE — 84550 ASSAY OF BLOOD/URIC ACID: CPT

## 2021-05-18 PROCEDURE — 86780 TREPONEMA PALLIDUM: CPT

## 2021-05-18 PROCEDURE — 36415 COLL VENOUS BLD VENIPUNCTURE: CPT

## 2021-05-18 PROCEDURE — 82570 ASSAY OF URINE CREATININE: CPT

## 2021-05-21 ENCOUNTER — HOSPITAL ENCOUNTER (OUTPATIENT)
Facility: MEDICAL CENTER | Age: 21
End: 2021-05-21
Attending: OBSTETRICS & GYNECOLOGY
Payer: COMMERCIAL

## 2021-05-21 LAB
EST. AVERAGE GLUCOSE BLD GHB EST-MCNC: 97 MG/DL
HBA1C MFR BLD: 5 % (ref 4–5.6)

## 2021-05-21 PROCEDURE — 83036 HEMOGLOBIN GLYCOSYLATED A1C: CPT

## 2021-05-27 ENCOUNTER — HOSPITAL ENCOUNTER (OUTPATIENT)
Facility: MEDICAL CENTER | Age: 21
End: 2021-05-27
Attending: NURSE PRACTITIONER
Payer: COMMERCIAL

## 2021-05-27 LAB
GLUCOSE 1H P CHAL SERPL-MCNC: 166 MG/DL (ref 65–199)
GLUCOSE 2H P CHAL SERPL-MCNC: 154 MG/DL (ref 65–139)
GLUCOSE BS SERPL-MCNC: 81 MG/DL (ref 65–99)

## 2021-05-27 PROCEDURE — 82951 GLUCOSE TOLERANCE TEST (GTT): CPT

## 2021-05-27 PROCEDURE — 82952 GTT-ADDED SAMPLES: CPT

## 2021-06-01 ENCOUNTER — HOSPITAL ENCOUNTER (EMERGENCY)
Facility: MEDICAL CENTER | Age: 21
End: 2021-06-01
Attending: OBSTETRICS & GYNECOLOGY | Admitting: OBSTETRICS & GYNECOLOGY
Payer: COMMERCIAL

## 2021-06-01 VITALS
OXYGEN SATURATION: 97 % | SYSTOLIC BLOOD PRESSURE: 117 MMHG | HEIGHT: 67 IN | WEIGHT: 246 LBS | HEART RATE: 93 BPM | BODY MASS INDEX: 38.61 KG/M2 | DIASTOLIC BLOOD PRESSURE: 56 MMHG

## 2021-06-01 LAB
APPEARANCE UR: CLEAR
COLOR UR AUTO: YELLOW
GLUCOSE UR QL STRIP.AUTO: NEGATIVE MG/DL
KETONES UR QL STRIP.AUTO: ABNORMAL MG/DL
LEUKOCYTE ESTERASE UR QL STRIP.AUTO: NEGATIVE
NITRITE UR QL STRIP.AUTO: NEGATIVE
PH UR STRIP.AUTO: 5.5 [PH] (ref 5–8)
PROT UR QL STRIP: ABNORMAL MG/DL
RBC UR QL AUTO: NEGATIVE
SP GR UR STRIP.AUTO: >=1.03 (ref 1–1.03)

## 2021-06-01 PROCEDURE — 302449 STATCHG TRIAGE ONLY (STATISTIC)

## 2021-06-01 PROCEDURE — 59025 FETAL NON-STRESS TEST: CPT

## 2021-06-01 PROCEDURE — 81002 URINALYSIS NONAUTO W/O SCOPE: CPT

## 2021-06-01 ASSESSMENT — FIBROSIS 4 INDEX: FIB4 SCORE: 0.47

## 2021-06-01 NOTE — PROGRESS NOTES
"Pt a , EDC , Due 29.0. Pt came in for \"loosing her mucous plug\" around 0900. Pt states thick mucous, no leaking of fluid but mucous consistency with no odor or itching.  Pt has a history of a shortened cervix. Pt has been taking progesterone nightly for this but states she missed her dose yesterday because she was traveling back from [facility] and forgot. Pt has had +FM and states intermittent tightening/cramping. Pt has a history of polyhyramnios, an anterior placenta, pregestational HTN, and recently found out today that she has GDM as well, pt states she talked to a representative today and she will follow a GDM diet.   1523 - Dr. Parks updated on pt arrival. Orders to watch pt on monitor and monitor for UCs. And collect UA.   1614 - Dr. Parks updated on UA. Pt states UCs/cramping is feeling better. Orders to discharge pt home and make sure pt has appointment for next week.   1630 - Pt feels safe to discharge home. Labor precautions given. GDM information given. Pt has appointment with Dr. Parks next week. Pt discharged home. Pt educated on hydration and rest.   "

## 2021-06-01 NOTE — DISCHARGE INSTRUCTIONS
General Instructions:  · If you think you are in labor, time contractions (lying on your left side) from the beginning of one contraction to the beginning of the next contraction for at least one hour.  · Increase fluid intake: you should consume 10-12 8 oz glasses of non-caffeinated fluid per day.  · Report any pressure or burning on urination to your physician.  · Monitor fetal movement: If you notice an absence or decrease in fetal movement, drink a large glass of water and rest on your side.  If there is no increase in movement, call your physician or go to the hospital for further evaluation.  · Report any sudden, sharp abdominal pain.  · Report any bleeding.  Spotting or pinkish discharge is normal after vaginal exam.  You may also spot after sexual intercourse.    Pre-term Labor (<37 weeks):  Call your physician or return to the hospital if:  · You have painless regular contractions more than 4 in one hour.  · Your water breaks (remember time and color).  · You have menstrual-like cramps, a low dull backache or pressure in your pelvis or back.  · Your baby does not move enough to complete the daily kick count (10 movements in 2 hours).  · Your baby moves much less often than on the days before or you have not felt your baby move all day.  · Please review the MEDICATION LIST section of your AFTER VISIT SUMMARY document.  · Take your medication as prescribed      Other Instructions:  Please carefully review your entire AFTER VISIT SUMMARY document for all discharge instructions.      Gestational Diabetes Mellitus, Diagnosis  Gestational diabetes (gestational diabetes mellitus) is a temporary form of diabetes that some women develop during pregnancy. It usually occurs around weeks 24-28 of pregnancy, and it goes away after delivery. Hormonal changes during pregnancy can interfere with insulin production and function, which may result in one or both of these problems:  · The pancreas does not make enough of a  hormone called insulin.  · Cells in the body do not respond properly to insulin that the body makes (insulin resistance).  Normally, insulin allows blood sugar (glucose) to enter cells in the body. The cells use glucose for energy. Insulin resistance or lack of insulin causes excess glucose to build up in the blood instead of going into cells. As a result, high blood glucose (hyperglycemia) develops.  If gestational diabetes is treated, it is not likely to cause problems. If it is not controlled with treatment, it may cause problems during labor and delivery, and some of those problems can be harmful to the unborn baby (fetus) and the mother.  Women who get gestational diabetes are more likely to develop it if they get pregnant again, and they are more likely to develop type 2 diabetes in the future.  What increases the risk?  This condition may be more likely to develop in pregnant women who:  · Are older than age 25 during pregnancy.  · Have a family history of diabetes.  · Are overweight.  · Had gestational diabetes in the past.  · Have polycystic ovary syndrome (PCOS).  · Are pregnant with twins or multiples.  · Are of American-Swazi, -American, /, or / descent.  What are the signs or symptoms?  Most women do not notice symptoms of gestational diabetes because the symptoms are similar to normal symptoms of pregnancy. Symptoms of gestational diabetes may include:  · Increased thirst (polydipsia).  · Increased hunger (polyphagia).  · Increased urination (polyuria).  How is this diagnosed?  This condition may be diagnosed based on your blood glucose level, which may be checked with one or more of the following blood tests:  · A fasting blood glucose (FBG) test. You will not be allowed to eat (you will fast) for 8 hours or longer before a blood sample is taken.  · A random blood glucose test. This checks your blood glucose at any time of day regardless of when you  ate.  · An oral glucose tolerance test (OGTT). This is usually done during weeks 24-28 of pregnancy.  ? For this test, you will have an FBG test done. Then, you will drink a beverage that contains glucose. Your blood glucose will be tested again one hour after you drink the glucose beverage (1-hour OGTT).  ? If the 1-hour OGTT result is at or above 140 mg/dL (7.8 mmol/L), you will repeat the OGTT. This time, your blood glucose will be tested 3 hours after you drink the glucose beverage (3-hour OGTT).  If you have risk factors, you may be screened for undiagnosed type 2 diabetes at your first health care visit during your pregnancy (prenatal visit).  How is this treated?         Your treatment may be managed by a specialist called an endocrinologist. This condition is treated by following instructions from your health care provider about:  · Eating a healthy diet and getting more physical activity. These changes are the most important ways to manage gestational diabetes.  · Checking your blood glucose. Do this as often as told.  · Taking diabetes medicines or insulin every day. These will only be prescribed if they are needed.  ? If you use insulin, you may need to adjust your dosage based on how physically active you are and what foods you eat. Your health care provider will tell you how to do this.  Your health care provider will set treatment goals for you based on the stage of your pregnancy and any other medical conditions you have. Generally, the goal of treatment is to maintain the following blood glucose levels during pregnancy:  · Before meals (preprandial): at or below 95 mg/dL (5.3 mmol/L).  · After meals (postprandial):  ? One hour after a meal: at or below 140 mg/dL (7.8 mmol/L).  ? Two hours after a meal: at or below 120 mg/dL (6.7 mmol/L).  · A1c (hemoglobin A1c) level: 6-6.5%.  Follow these instructions at home:  Questions to ask your health care provider  · Consider asking the following  questions:  ? Do I need to meet with a diabetes educator?  ? What equipment will I need to manage my diabetes at home?  ? What diabetes medicines do I need, and when should I take them?  ? How often do I need to check my blood glucose?  ? What number can I call if I have questions?  ? When is my next appointment?  General instructions  · Take over-the-counter and prescription medicines only as told by your health care provider.  · Manage your weight gain during pregnancy. The amount of weight that you are expected to gain depends on your pre-pregnancy BMI (body mass index).  · Keep all follow-up visits as told by your health care provider. This is important.  · For more information about diabetes, visit:  ? American Diabetes Association (ADA): www.diabetes.org  ? American Association of Diabetes Educators (AADE): www.diabeteseducator.org  Contact a health care provider if:  · Your blood glucose level is at or above 240 mg/dL (13.3 mmol/L).  · Your blood glucose level is at or above 200 mg/dL (11.1 mmol/L) and you have ketones in your urine.  · You have been sick or have had a fever for 2 days or longer and you are not getting better.  · You have any of the following problems for more than 6 hours:  ? You cannot eat or drink.  ? You have nausea and vomiting.  ? You have diarrhea.  Get help right away if:  · Your blood glucose is lower than 54 mg/dL (3 mmol/L).  · You become confused or you have trouble thinking clearly.  · You have difficulty breathing.  · You have moderate or large ketone levels in your urine.  · Your baby is moving around less than usual.  · You develop unusual discharge or bleeding from your vagina.  · You start having contractions early (prematurely). Contractions may feel like a tightening in your lower abdomen.  Summary  · Gestational diabetes (gestational diabetes mellitus) is a temporary form of diabetes that some women develop during pregnancy. It usually occurs around weeks 24-28 of  pregnancy, and it goes away after delivery.  · This condition is treated by making diet and lifestyle changes and taking diabetes medicines or insulin, if needed.  · Women who get gestational diabetes are more likely to develop it if they get pregnant again, and they are more likely to develop type 2 diabetes in the future.  This information is not intended to replace advice given to you by your health care provider. Make sure you discuss any questions you have with your health care provider.  Document Released: 2002 Document Revised: 2019 Document Reviewed: 2017  "[employer] Patient Education ©  "[employer] Inc.      Gestational Diabetes Mellitus, Self Care  Caring for yourself after you have been diagnosed with gestational diabetes (gestational diabetes mellitus) means keeping your blood sugar (glucose) under control. You can do that with a balance of:  · Nutrition.  · Exercise.  · Lifestyle changes.  · Medicines or insulin, if necessary.  · Support from your team of health care providers and others.  The following information explains what you need to know to manage your gestational diabetes at home.  What are the risks?  If gestational diabetes is treated, it is unlikely to cause problems. If it is not controlled with treatment, it may cause problems during labor and delivery, and some of those problems can be harmful to the unborn baby (fetus) and the mother. Uncontrolled gestational diabetes may also cause the  baby to have breathing problems and low blood glucose.  Women who get gestational diabetes are more likely to develop it if they get pregnant again, and they are more likely to develop type 2 diabetes in the future.  How to monitor blood glucose    · Check your blood glucose every day during your pregnancy. Do this as often as told by your health care provider.  · Contact your health care provider if your blood glucose is above your target for two tests in a row.  Your health care  provider will set individualized treatment goals for you. Generally, the goal of treatment is to maintain the following blood glucose levels during pregnancy:  · Before meals (preprandial): at or below 95 mg/dL (5.3 mmol/L).  · After meals (postprandial):  ? One hour after a meal: at or below 140 mg/dL (7.8 mmol/L).  ? Two hours after a meal: at or below 120 mg/dL (6.7 mmol/L).  · A1c (hemoglobin A1c) level: 6-6.5%.  How to manage hyperglycemia and hypoglycemia  Hyperglycemia symptoms  Hyperglycemia, also called high blood glucose, occurs when blood glucose is too high. Make sure you know the early signs of hyperglycemia, such as:  · Increased thirst.  · Hunger.  · Feeling very tired.  · Needing to urinate more often than usual.  · Blurry vision.  Hypoglycemia symptoms  Hypoglycemia, also called low blood glucose, occurs with a blood glucose level at or below 70 mg/dL (3.9 mmol/L). The risk for hypoglycemia increases during or after exercise, during sleep, during illness, and when skipping meals or not eating for a long time (fasting). Symptoms may include:  · Hunger.  · Anxiety.  · Sweating and feeling clammy.  · Confusion.  · Dizziness or feeling light-headed.  · Sleepiness.  · Nausea.  · Increased heart rate.  · Headache.  · Blurry vision.  · Irritability.  · Tingling or numbness around the mouth, lips, or tongue.  · A change in coordination.  · Restless sleep.  · Fainting.  · Seizure.  It is important to know the symptoms of hypoglycemia and treat it right away. Always have a 15-gram rapid-acting carbohydrate snack with you to treat low blood glucose. Family members and close friends should also know the symptoms and should understand how to treat hypoglycemia, in case you are not able to treat yourself.  Treating hypoglycemia  If you are alert and able to swallow safely, follow the 15:15 rule:  · Take 15 grams of a rapid-acting carbohydrate. Talk with your health care provider about how much you should  take.  · Rapid-acting options include:  ? Glucose pills (take 15 grams).  ? 6-8 pieces of hard candy.  ? 4-6 oz (120-150 mL) of fruit juice.  ? 4-6 oz (120-150 mL) of regular (not diet) soda.  ? 1 Tbsp (15 mL) honey or sugar.  · Check your blood glucose 15 minutes after you take the carbohydrate.  · If the repeat blood glucose level is still at or below 70 mg/dL (3.9 mmol/L), take 15 grams of a carbohydrate again.  · If your blood glucose level does not increase above 70 mg/dL (3.9 mmol/L) after 3 tries, seek emergency medical care.  · After your blood glucose level returns to normal, eat a meal or a snack within 1 hour.  Treating severe hypoglycemia  Severe hypoglycemia is when your blood glucose level is at or below 54 mg/dL (3 mmol/L). Severe hypoglycemia is an emergency. Do not wait to see if the symptoms will go away. Get medical help right away. Call your local emergency services (911 in the [place]  If you have severe hypoglycemia and you cannot eat or drink, you may need an injection of glucagon. A family member or close friend should learn how to check your blood glucose and how to give you a glucagon injection. Ask your health care provider if you need to have an emergency glucagon injection kit available.  Severe hypoglycemia may need to be treated in a hospital. The treatment may include getting glucose through an IV. You may also need treatment for the cause of your hypoglycemia.  Follow these instructions at home:  Take diabetes medicines as told  · If your health care provider prescribed insulin or diabetes medicines, take them every day.  · Do not run out of insulin or other diabetes medicines that you take. Plan ahead so you always have these available.  · If you use insulin, adjust your dosage based on how physically active you are and what foods you eat. Your health care provider will tell you how to adjust your dosage.  Make healthy food choices    The things that you eat and drink affect your  blood glucose (and your insulin dose, if this applies). Making good choices helps to control your diabetes and prevent other health problems. A healthy meal plan includes eating lean proteins, complex carbohydrates, fresh fruits and vegetables, low-fat dairy products, and healthy fats.  Make an appointment to see a diet and nutrition specialist (registered dietitian) to help you create an eating plan that is right for you. Make sure that you:  · Follow instructions from your health care provider about eating or drinking restrictions.  · Drink enough fluid to keep your urine pale yellow.  · Eat healthy snacks between nutritious meals.  · Keep a record of the carbohydrates that you eat. Do this by reading food labels and learning the standard serving sizes of foods.  · Follow your sick day plan whenever you cannot eat or drink as usual. Make this plan in advance with your health care provider.    Stay active  · Do 30 or more minutes of physical activity a day, or as much physical activity as your health care provider recommends during your pregnancy.  ? Doing 10 minutes of exercise starting 30 minutes after each meal may help to control postprandial blood glucose levels.  · If you start a new exercise or activity, work with your health care provider to adjust your insulin, medicines, or food intake as needed.  Make healthy lifestyle choices  · Do not drink alcohol.  · Do not use any tobacco products, such as cigarettes, chewing tobacco, and e-cigarettes. If you need help quitting, ask your health care provider.  · Learn to manage stress. If you need help with this, ask your health care provider.  Care for your body  · Keep your immunizations up to date.  · Brush your teeth and gums two times a day, and floss one or more times a day. Visit your dentist one or more times every 6 months.  · Maintain a healthy weight during your pregnancy.  General instructions  · Take over-the-counter and prescription medicines only as  told by your health care provider.  · Talk with your health care provider about your risk for high blood pressure during pregnancy (preeclampsia or eclampsia).  · Share your diabetes management plan with people in your workplace, school, and household.  · Check your urine for ketones during your pregnancy when you are ill and as told by your health care provider.  · Carry a medical alert card or wear medical alert jewelry that says you have gestational diabetes.  · Keep all follow-up visits during your pregnancy (prenatal) and after delivery () as told by your health care provider. This is important.  Get the care that you need after delivery  · Have your blood glucose level checked 4-12 weeks after delivery. This is done with an oral glucose tolerance test (OGTT).  · Get screened for diabetes at least every 3 years, or as often as told by your health care provider.  Questions to ask your health care provider  · Do I need to meet with a diabetes educator?  · Where can I find a support group for people with gestational diabetes?  Where to find more information  For more information about gestational diabetes, visit:  · American Diabetes Association (ADA): www.diabetes.org  · Centers for Disease Control and Prevention (CDC): www.cdc.gov  Summary  · Check your blood glucose every day during your pregnancy. Do this as often as told by your health care provider.  · If your health care provider prescribed insulin or diabetes medicines, take them every day as told.  · Keep all follow-up visits during your pregnancy (prenatal) and after delivery () as told by your health care provider. This is important.  · Have your blood glucose level checked 4-12 weeks after delivery.  This information is not intended to replace advice given to you by your health care provider. Make sure you discuss any questions you have with your health care provider.  Document Released: 04/10/2017 Document Revised: 06/10/2019  Document Reviewed: 01/20/2017  Elsevier Patient Education © 2020 [employer]

## 2021-07-12 ENCOUNTER — HOSPITAL ENCOUNTER (OUTPATIENT)
Facility: MEDICAL CENTER | Age: 21
End: 2021-07-12
Attending: OBSTETRICS & GYNECOLOGY
Payer: COMMERCIAL

## 2021-07-12 PROCEDURE — 87150 DNA/RNA AMPLIFIED PROBE: CPT

## 2021-07-12 PROCEDURE — 87081 CULTURE SCREEN ONLY: CPT

## 2021-07-14 LAB
GP B STREP DNA SPEC QL NAA+PROBE: NEGATIVE
GP B STREP DNA SPEC QL NAA+PROBE: NORMAL

## 2021-08-04 ENCOUNTER — HOSPITAL ENCOUNTER (EMERGENCY)
Facility: MEDICAL CENTER | Age: 21
End: 2021-08-04
Attending: OBSTETRICS & GYNECOLOGY | Admitting: OBSTETRICS & GYNECOLOGY
Payer: COMMERCIAL

## 2021-08-04 VITALS
BODY MASS INDEX: 42.38 KG/M2 | RESPIRATION RATE: 16 BRPM | HEART RATE: 86 BPM | TEMPERATURE: 98 F | DIASTOLIC BLOOD PRESSURE: 64 MMHG | HEIGHT: 67 IN | WEIGHT: 270 LBS | SYSTOLIC BLOOD PRESSURE: 120 MMHG

## 2021-08-04 LAB — CRYSTALS AMN MICRO: NORMAL

## 2021-08-04 PROCEDURE — 59025 FETAL NON-STRESS TEST: CPT

## 2021-08-04 PROCEDURE — 302449 STATCHG TRIAGE ONLY (STATISTIC)

## 2021-08-04 PROCEDURE — 89060 EXAM SYNOVIAL FLUID CRYSTALS: CPT

## 2021-08-04 ASSESSMENT — PAIN SCALES - GENERAL: PAINLEVEL: 0 - NO PAIN

## 2021-08-04 ASSESSMENT — FIBROSIS 4 INDEX: FIB4 SCORE: 0.47

## 2021-08-04 NOTE — PROGRESS NOTES
20 y.o  EDC 21 (38.2)    Pt c/o cramping last night and today with possible leaking. + FM. Pt denies bleeding or contractions. Fern collected, no pooling visualized.    Fern = negative. Dr. Parks notified on pt status. Orders to D/C and plan for scheduled IOL tomorrow.    D/C instructions reviewed with pt. Discharged in stable condition.

## 2021-08-05 ENCOUNTER — HOSPITAL ENCOUNTER (INPATIENT)
Facility: MEDICAL CENTER | Age: 21
LOS: 2 days | End: 2021-08-07
Attending: OBSTETRICS & GYNECOLOGY | Admitting: OBSTETRICS & GYNECOLOGY
Payer: COMMERCIAL

## 2021-08-05 ENCOUNTER — APPOINTMENT (OUTPATIENT)
Dept: OBGYN | Facility: MEDICAL CENTER | Age: 21
End: 2021-08-05
Attending: OBSTETRICS & GYNECOLOGY
Payer: COMMERCIAL

## 2021-08-05 ENCOUNTER — ANESTHESIA (OUTPATIENT)
Dept: ANESTHESIOLOGY | Facility: MEDICAL CENTER | Age: 21
End: 2021-08-05
Payer: COMMERCIAL

## 2021-08-05 ENCOUNTER — ANESTHESIA EVENT (OUTPATIENT)
Dept: ANESTHESIOLOGY | Facility: MEDICAL CENTER | Age: 21
End: 2021-08-05
Payer: COMMERCIAL

## 2021-08-05 LAB
ALBUMIN SERPL BCP-MCNC: 3.3 G/DL (ref 3.2–4.9)
ALBUMIN/GLOB SERPL: 1.2 G/DL
ALP SERPL-CCNC: 136 U/L (ref 30–99)
ALT SERPL-CCNC: 6 U/L (ref 2–50)
ANION GAP SERPL CALC-SCNC: 12 MMOL/L (ref 7–16)
AST SERPL-CCNC: 10 U/L (ref 12–45)
BASOPHILS # BLD AUTO: 0.4 % (ref 0–1.8)
BASOPHILS # BLD: 0.03 K/UL (ref 0–0.12)
BILIRUB SERPL-MCNC: 0.4 MG/DL (ref 0.1–1.5)
BUN SERPL-MCNC: 4 MG/DL (ref 8–22)
CALCIUM SERPL-MCNC: 9.1 MG/DL (ref 8.5–10.5)
CHLORIDE SERPL-SCNC: 108 MMOL/L (ref 96–112)
CO2 SERPL-SCNC: 19 MMOL/L (ref 20–33)
CREAT SERPL-MCNC: 0.33 MG/DL (ref 0.5–1.4)
CREAT UR-MCNC: 59.49 MG/DL
EOSINOPHIL # BLD AUTO: 0.07 K/UL (ref 0–0.51)
EOSINOPHIL NFR BLD: 0.8 % (ref 0–6.9)
ERYTHROCYTE [DISTWIDTH] IN BLOOD BY AUTOMATED COUNT: 41.6 FL (ref 35.9–50)
GLOBULIN SER CALC-MCNC: 2.8 G/DL (ref 1.9–3.5)
GLUCOSE BLD-MCNC: 72 MG/DL (ref 65–99)
GLUCOSE BLD-MCNC: 78 MG/DL (ref 65–99)
GLUCOSE BLD-MCNC: 87 MG/DL (ref 65–99)
GLUCOSE BLD-MCNC: 97 MG/DL (ref 65–99)
GLUCOSE SERPL-MCNC: 79 MG/DL (ref 65–99)
HCT VFR BLD AUTO: 40.6 % (ref 37–47)
HGB BLD-MCNC: 14.1 G/DL (ref 12–16)
HOLDING TUBE BB 8507: NORMAL
IMM GRANULOCYTES # BLD AUTO: 0.05 K/UL (ref 0–0.11)
IMM GRANULOCYTES NFR BLD AUTO: 0.6 % (ref 0–0.9)
LYMPHOCYTES # BLD AUTO: 1.26 K/UL (ref 1–4.8)
LYMPHOCYTES NFR BLD: 14.7 % (ref 22–41)
MCH RBC QN AUTO: 29.6 PG (ref 27–33)
MCHC RBC AUTO-ENTMCNC: 34.7 G/DL (ref 33.6–35)
MCV RBC AUTO: 85.1 FL (ref 81.4–97.8)
MONOCYTES # BLD AUTO: 0.49 K/UL (ref 0–0.85)
MONOCYTES NFR BLD AUTO: 5.7 % (ref 0–13.4)
NEUTROPHILS # BLD AUTO: 6.67 K/UL (ref 2–7.15)
NEUTROPHILS NFR BLD: 77.8 % (ref 44–72)
NRBC # BLD AUTO: 0 K/UL
NRBC BLD-RTO: 0 /100 WBC
PLATELET # BLD AUTO: 187 K/UL (ref 164–446)
PMV BLD AUTO: 12.8 FL (ref 9–12.9)
POTASSIUM SERPL-SCNC: 3.6 MMOL/L (ref 3.6–5.5)
PROT SERPL-MCNC: 6.1 G/DL (ref 6–8.2)
PROT UR-MCNC: 12 MG/DL (ref 0–15)
PROT/CREAT UR: 202 MG/G (ref 10–107)
RBC # BLD AUTO: 4.77 M/UL (ref 4.2–5.4)
SODIUM SERPL-SCNC: 139 MMOL/L (ref 135–145)
WBC # BLD AUTO: 8.6 K/UL (ref 4.8–10.8)

## 2021-08-05 PROCEDURE — 84156 ASSAY OF PROTEIN URINE: CPT

## 2021-08-05 PROCEDURE — 85025 COMPLETE CBC W/AUTO DIFF WBC: CPT

## 2021-08-05 PROCEDURE — 770002 HCHG ROOM/CARE - OB PRIVATE (112)

## 2021-08-05 PROCEDURE — 700111 HCHG RX REV CODE 636 W/ 250 OVERRIDE (IP)

## 2021-08-05 PROCEDURE — 700111 HCHG RX REV CODE 636 W/ 250 OVERRIDE (IP): Performed by: ANESTHESIOLOGY

## 2021-08-05 PROCEDURE — 700105 HCHG RX REV CODE 258: Performed by: OBSTETRICS & GYNECOLOGY

## 2021-08-05 PROCEDURE — 10H073Z INSERTION OF MONITORING ELECTRODE INTO PRODUCTS OF CONCEPTION, VIA NATURAL OR ARTIFICIAL OPENING: ICD-10-PCS | Performed by: OBSTETRICS & GYNECOLOGY

## 2021-08-05 PROCEDURE — 36415 COLL VENOUS BLD VENIPUNCTURE: CPT

## 2021-08-05 PROCEDURE — 10907ZC DRAINAGE OF AMNIOTIC FLUID, THERAPEUTIC FROM PRODUCTS OF CONCEPTION, VIA NATURAL OR ARTIFICIAL OPENING: ICD-10-PCS | Performed by: OBSTETRICS & GYNECOLOGY

## 2021-08-05 PROCEDURE — 10H07YZ INSERTION OF OTHER DEVICE INTO PRODUCTS OF CONCEPTION, VIA NATURAL OR ARTIFICIAL OPENING: ICD-10-PCS | Performed by: OBSTETRICS & GYNECOLOGY

## 2021-08-05 PROCEDURE — 3E033VJ INTRODUCTION OF OTHER HORMONE INTO PERIPHERAL VEIN, PERCUTANEOUS APPROACH: ICD-10-PCS | Performed by: OBSTETRICS & GYNECOLOGY

## 2021-08-05 PROCEDURE — 80053 COMPREHEN METABOLIC PANEL: CPT

## 2021-08-05 PROCEDURE — 82962 GLUCOSE BLOOD TEST: CPT | Mod: 91

## 2021-08-05 PROCEDURE — 82570 ASSAY OF URINE CREATININE: CPT

## 2021-08-05 PROCEDURE — 4A1H74Z MONITORING OF PRODUCTS OF CONCEPTION, CARDIAC ELECTRICAL ACTIVITY, VIA NATURAL OR ARTIFICIAL OPENING: ICD-10-PCS | Performed by: OBSTETRICS & GYNECOLOGY

## 2021-08-05 RX ORDER — BUPIVACAINE HYDROCHLORIDE 2.5 MG/ML
INJECTION, SOLUTION EPIDURAL; INFILTRATION; INTRACAUDAL
Status: COMPLETED
Start: 2021-08-05 | End: 2021-08-05

## 2021-08-05 RX ORDER — ACETAMINOPHEN 500 MG
1000 TABLET ORAL EVERY 6 HOURS PRN
Status: DISCONTINUED | OUTPATIENT
Start: 2021-08-05 | End: 2021-08-06

## 2021-08-05 RX ORDER — BUPIVACAINE HYDROCHLORIDE 2.5 MG/ML
INJECTION, SOLUTION EPIDURAL; INFILTRATION; INTRACAUDAL PRN
Status: DISCONTINUED | OUTPATIENT
Start: 2021-08-05 | End: 2021-08-06 | Stop reason: SURG

## 2021-08-05 RX ORDER — ROPIVACAINE HYDROCHLORIDE 2 MG/ML
INJECTION, SOLUTION EPIDURAL; INFILTRATION; PERINEURAL
Status: COMPLETED
Start: 2021-08-05 | End: 2021-08-05

## 2021-08-05 RX ORDER — SODIUM CHLORIDE, SODIUM LACTATE, POTASSIUM CHLORIDE, CALCIUM CHLORIDE 600; 310; 30; 20 MG/100ML; MG/100ML; MG/100ML; MG/100ML
INJECTION, SOLUTION INTRAVENOUS CONTINUOUS
Status: ACTIVE | OUTPATIENT
Start: 2021-08-05 | End: 2021-08-05

## 2021-08-05 RX ORDER — MISOPROSTOL 200 UG/1
1000 TABLET ORAL
Status: COMPLETED | OUTPATIENT
Start: 2021-08-05 | End: 2021-08-06

## 2021-08-05 RX ORDER — ASPIRIN 81 MG/1
81 TABLET, CHEWABLE ORAL DAILY
Status: ON HOLD | COMMUNITY
End: 2021-08-07

## 2021-08-05 RX ORDER — HYDROXYZINE 50 MG/1
50 TABLET, FILM COATED ORAL EVERY 6 HOURS PRN
Status: DISCONTINUED | OUTPATIENT
Start: 2021-08-05 | End: 2021-08-06 | Stop reason: HOSPADM

## 2021-08-05 RX ORDER — CARBOPROST TROMETHAMINE 250 UG/ML
250 INJECTION, SOLUTION INTRAMUSCULAR
Status: DISCONTINUED | OUTPATIENT
Start: 2021-08-05 | End: 2021-08-06 | Stop reason: HOSPADM

## 2021-08-05 RX ORDER — ROPIVACAINE HYDROCHLORIDE 2 MG/ML
INJECTION, SOLUTION EPIDURAL; INFILTRATION
Status: DISCONTINUED | OUTPATIENT
Start: 2021-08-05 | End: 2021-08-06 | Stop reason: SURG

## 2021-08-05 RX ADMIN — ROPIVACAINE HYDROCHLORIDE 200 MG: 2 INJECTION, SOLUTION EPIDURAL; INFILTRATION; PERINEURAL at 20:00

## 2021-08-05 RX ADMIN — FENTANYL CITRATE 100 MCG: 50 INJECTION, SOLUTION INTRAMUSCULAR; INTRAVENOUS at 20:03

## 2021-08-05 RX ADMIN — SODIUM CHLORIDE, POTASSIUM CHLORIDE, SODIUM LACTATE AND CALCIUM CHLORIDE: 600; 310; 30; 20 INJECTION, SOLUTION INTRAVENOUS at 07:40

## 2021-08-05 RX ADMIN — ROPIVACAINE HYDROCHLORIDE 200 MG: 2 INJECTION, SOLUTION EPIDURAL; INFILTRATION at 20:00

## 2021-08-05 RX ADMIN — BUPIVACAINE HYDROCHLORIDE 10 ML: 2.5 INJECTION, SOLUTION EPIDURAL; INFILTRATION; INTRACAUDAL; PERINEURAL at 20:03

## 2021-08-05 RX ADMIN — ROPIVACAINE HYDROCHLORIDE 10 ML/HR: 2 INJECTION, SOLUTION EPIDURAL; INFILTRATION at 20:08

## 2021-08-05 RX ADMIN — OXYTOCIN 20 UNITS: 10 INJECTION, SOLUTION INTRAMUSCULAR; INTRAVENOUS at 07:38

## 2021-08-05 ASSESSMENT — PAIN DESCRIPTION - PAIN TYPE
TYPE: ACUTE PAIN
TYPE: ACUTE PAIN

## 2021-08-05 ASSESSMENT — FIBROSIS 4 INDEX: FIB4 SCORE: 0.47

## 2021-08-05 ASSESSMENT — PATIENT HEALTH QUESTIONNAIRE - PHQ9
2. FEELING DOWN, DEPRESSED, IRRITABLE, OR HOPELESS: NOT AT ALL
1. LITTLE INTEREST OR PLEASURE IN DOING THINGS: NOT AT ALL
SUM OF ALL RESPONSES TO PHQ9 QUESTIONS 1 AND 2: 0

## 2021-08-05 ASSESSMENT — LIFESTYLE VARIABLES: ALCOHOL_USE: NO

## 2021-08-05 NOTE — PROGRESS NOTES
Lat Entry  0625-TOCO and US on.  Pt here for her scheduled IOL for GDM and increased BPs.  0708SVE per Dr. Alatorre=2/50/-3.  Orders for oxytocin.  0738-oxytocin started.  1230-Dr. Parks updated and poc for ROM after office hours.  Orders received for FSBS q 4 hours and PIH labs.  1545-pt on BB. 1620-Pt back in bed due to inability to monitor FHTs.  1715-Report given to Simon MEDEROS-pt care assumed

## 2021-08-06 LAB
ERYTHROCYTE [DISTWIDTH] IN BLOOD BY AUTOMATED COUNT: 41.4 FL (ref 35.9–50)
GLUCOSE BLD-MCNC: 79 MG/DL (ref 65–99)
HCT VFR BLD AUTO: 36.8 % (ref 37–47)
HGB BLD-MCNC: 12.8 G/DL (ref 12–16)
MCH RBC QN AUTO: 29.5 PG (ref 27–33)
MCHC RBC AUTO-ENTMCNC: 34.8 G/DL (ref 33.6–35)
MCV RBC AUTO: 84.8 FL (ref 81.4–97.8)
PLATELET # BLD AUTO: 172 K/UL (ref 164–446)
PMV BLD AUTO: 11.8 FL (ref 9–12.9)
RBC # BLD AUTO: 4.34 M/UL (ref 4.2–5.4)
WBC # BLD AUTO: 13.9 K/UL (ref 4.8–10.8)

## 2021-08-06 PROCEDURE — 85027 COMPLETE CBC AUTOMATED: CPT

## 2021-08-06 PROCEDURE — 700111 HCHG RX REV CODE 636 W/ 250 OVERRIDE (IP): Performed by: OBSTETRICS & GYNECOLOGY

## 2021-08-06 PROCEDURE — 59409 OBSTETRICAL CARE: CPT

## 2021-08-06 PROCEDURE — A9270 NON-COVERED ITEM OR SERVICE: HCPCS | Performed by: OBSTETRICS & GYNECOLOGY

## 2021-08-06 PROCEDURE — 304965 HCHG RECOVERY SERVICES

## 2021-08-06 PROCEDURE — 700102 HCHG RX REV CODE 250 W/ 637 OVERRIDE(OP): Performed by: OBSTETRICS & GYNECOLOGY

## 2021-08-06 PROCEDURE — 700105 HCHG RX REV CODE 258: Performed by: OBSTETRICS & GYNECOLOGY

## 2021-08-06 PROCEDURE — 0HQ9XZZ REPAIR PERINEUM SKIN, EXTERNAL APPROACH: ICD-10-PCS | Performed by: OBSTETRICS & GYNECOLOGY

## 2021-08-06 PROCEDURE — 36415 COLL VENOUS BLD VENIPUNCTURE: CPT

## 2021-08-06 PROCEDURE — 82962 GLUCOSE BLOOD TEST: CPT

## 2021-08-06 PROCEDURE — 770002 HCHG ROOM/CARE - OB PRIVATE (112)

## 2021-08-06 PROCEDURE — 303615 HCHG EPIDURAL/SPINAL ANESTHESIA FOR LABOR

## 2021-08-06 RX ORDER — SODIUM CHLORIDE, SODIUM LACTATE, POTASSIUM CHLORIDE, CALCIUM CHLORIDE 600; 310; 30; 20 MG/100ML; MG/100ML; MG/100ML; MG/100ML
INJECTION, SOLUTION INTRAVENOUS PRN
Status: DISCONTINUED | OUTPATIENT
Start: 2021-08-06 | End: 2021-08-07 | Stop reason: HOSPADM

## 2021-08-06 RX ORDER — HYDROCODONE BITARTRATE AND ACETAMINOPHEN 5; 325 MG/1; MG/1
1 TABLET ORAL EVERY 4 HOURS PRN
Status: DISCONTINUED | OUTPATIENT
Start: 2021-08-06 | End: 2021-08-07 | Stop reason: HOSPADM

## 2021-08-06 RX ORDER — IBUPROFEN 600 MG/1
600 TABLET ORAL EVERY 6 HOURS PRN
Status: DISCONTINUED | OUTPATIENT
Start: 2021-08-06 | End: 2021-08-07 | Stop reason: HOSPADM

## 2021-08-06 RX ORDER — MISOPROSTOL 200 UG/1
600 TABLET ORAL
Status: DISCONTINUED | OUTPATIENT
Start: 2021-08-06 | End: 2021-08-07 | Stop reason: HOSPADM

## 2021-08-06 RX ORDER — SODIUM CHLORIDE, SODIUM LACTATE, POTASSIUM CHLORIDE, AND CALCIUM CHLORIDE .6; .31; .03; .02 G/100ML; G/100ML; G/100ML; G/100ML
1000 INJECTION, SOLUTION INTRAVENOUS
Status: DISCONTINUED | OUTPATIENT
Start: 2021-08-06 | End: 2021-08-06 | Stop reason: HOSPADM

## 2021-08-06 RX ORDER — ONDANSETRON 4 MG/1
4 TABLET, ORALLY DISINTEGRATING ORAL EVERY 6 HOURS PRN
Status: DISCONTINUED | OUTPATIENT
Start: 2021-08-06 | End: 2021-08-07 | Stop reason: HOSPADM

## 2021-08-06 RX ORDER — AMPICILLIN 2 G/1
INJECTION, POWDER, FOR SOLUTION INTRAVENOUS
Status: ACTIVE
Start: 2021-08-06 | End: 2021-08-06

## 2021-08-06 RX ORDER — ROPIVACAINE HYDROCHLORIDE 2 MG/ML
INJECTION, SOLUTION EPIDURAL; INFILTRATION; PERINEURAL CONTINUOUS
Status: DISCONTINUED | OUTPATIENT
Start: 2021-08-06 | End: 2021-08-06 | Stop reason: HOSPADM

## 2021-08-06 RX ORDER — ACETAMINOPHEN 325 MG/1
325 TABLET ORAL EVERY 4 HOURS PRN
Status: DISCONTINUED | OUTPATIENT
Start: 2021-08-06 | End: 2021-08-07 | Stop reason: HOSPADM

## 2021-08-06 RX ORDER — HYDROCODONE BITARTRATE AND ACETAMINOPHEN 10; 325 MG/1; MG/1
1 TABLET ORAL EVERY 4 HOURS PRN
Status: DISCONTINUED | OUTPATIENT
Start: 2021-08-06 | End: 2021-08-07 | Stop reason: HOSPADM

## 2021-08-06 RX ORDER — VITAMIN A ACETATE, BETA CAROTENE, ASCORBIC ACID, CHOLECALCIFEROL, .ALPHA.-TOCOPHEROL ACETATE, DL-, THIAMINE MONONITRATE, RIBOFLAVIN, NIACINAMIDE, PYRIDOXINE HYDROCHLORIDE, FOLIC ACID, CYANOCOBALAMIN, CALCIUM CARBONATE, FERROUS FUMARATE, ZINC OXIDE, CUPRIC OXIDE 3080; 12; 120; 400; 1; 1.84; 3; 20; 22; 920; 25; 200; 27; 10; 2 [IU]/1; UG/1; MG/1; [IU]/1; MG/1; MG/1; MG/1; MG/1; MG/1; [IU]/1; MG/1; MG/1; MG/1; MG/1; MG/1
1 TABLET, FILM COATED ORAL
Status: DISCONTINUED | OUTPATIENT
Start: 2021-08-06 | End: 2021-08-07 | Stop reason: HOSPADM

## 2021-08-06 RX ORDER — SODIUM CHLORIDE 9 MG/ML
INJECTION, SOLUTION INTRAVENOUS
Status: ACTIVE
Start: 2021-08-06 | End: 2021-08-06

## 2021-08-06 RX ORDER — DOCUSATE SODIUM 100 MG/1
100 CAPSULE, LIQUID FILLED ORAL 2 TIMES DAILY PRN
Status: DISCONTINUED | OUTPATIENT
Start: 2021-08-06 | End: 2021-08-07 | Stop reason: HOSPADM

## 2021-08-06 RX ORDER — ONDANSETRON 2 MG/ML
4 INJECTION INTRAMUSCULAR; INTRAVENOUS EVERY 6 HOURS PRN
Status: DISCONTINUED | OUTPATIENT
Start: 2021-08-06 | End: 2021-08-07 | Stop reason: HOSPADM

## 2021-08-06 RX ORDER — SODIUM CHLORIDE, SODIUM LACTATE, POTASSIUM CHLORIDE, AND CALCIUM CHLORIDE .6; .31; .03; .02 G/100ML; G/100ML; G/100ML; G/100ML
250 INJECTION, SOLUTION INTRAVENOUS PRN
Status: DISCONTINUED | OUTPATIENT
Start: 2021-08-06 | End: 2021-08-06 | Stop reason: HOSPADM

## 2021-08-06 RX ORDER — METHYLERGONOVINE MALEATE 0.2 MG/ML
0.2 INJECTION INTRAVENOUS
Status: DISCONTINUED | OUTPATIENT
Start: 2021-08-06 | End: 2021-08-07 | Stop reason: HOSPADM

## 2021-08-06 RX ADMIN — PRENATAL WITH FERROUS FUM AND FOLIC ACID 1 TABLET: 3080; 920; 120; 400; 22; 1.84; 3; 20; 10; 1; 12; 200; 27; 25; 2 TABLET ORAL at 13:38

## 2021-08-06 RX ADMIN — GENTAMICIN SULFATE 460 MG: 40 INJECTION, SOLUTION INTRAMUSCULAR; INTRAVENOUS at 00:51

## 2021-08-06 RX ADMIN — DOCUSATE SODIUM 100 MG: 100 CAPSULE, LIQUID FILLED ORAL at 21:23

## 2021-08-06 RX ADMIN — AMPICILLIN SODIUM 2000 MG: 2 INJECTION, POWDER, FOR SOLUTION INTRAMUSCULAR; INTRAVENOUS at 00:16

## 2021-08-06 RX ADMIN — OXYTOCIN 2000 ML/HR: 10 INJECTION, SOLUTION INTRAMUSCULAR; INTRAVENOUS at 03:35

## 2021-08-06 RX ADMIN — MISOPROSTOL 1000 MCG: 200 TABLET ORAL at 03:41

## 2021-08-06 RX ADMIN — ACETAMINOPHEN 1000 MG: 500 TABLET ORAL at 00:15

## 2021-08-06 RX ADMIN — IBUPROFEN 600 MG: 600 TABLET ORAL at 21:23

## 2021-08-06 RX ADMIN — OXYTOCIN 125 ML/HR: 10 INJECTION, SOLUTION INTRAMUSCULAR; INTRAVENOUS at 04:40

## 2021-08-06 RX ADMIN — HYDROCODONE BITARTRATE AND ACETAMINOPHEN 1 TABLET: 5; 325 TABLET ORAL at 05:04

## 2021-08-06 ASSESSMENT — EDINBURGH POSTNATAL DEPRESSION SCALE (EPDS)
THE THOUGHT OF HARMING MYSELF HAS OCCURRED TO ME: NEVER
I HAVE BLAMED MYSELF UNNECESSARILY WHEN THINGS WENT WRONG: YES, SOME OF THE TIME
I HAVE BEEN ABLE TO LAUGH AND SEE THE FUNNY SIDE OF THINGS: AS MUCH AS I ALWAYS COULD
I HAVE BEEN SO UNHAPPY THAT I HAVE HAD DIFFICULTY SLEEPING: NOT AT ALL
I HAVE BEEN SO UNHAPPY THAT I HAVE BEEN CRYING: NO, NEVER
I HAVE LOOKED FORWARD WITH ENJOYMENT TO THINGS: AS MUCH AS I EVER DID
THINGS HAVE BEEN GETTING ON TOP OF ME: NO, I HAVE BEEN COPING AS WELL AS EVER
I HAVE FELT SAD OR MISERABLE: NO, NOT AT ALL
I HAVE FELT SCARED OR PANICKY FOR NO GOOD REASON: NO, NOT MUCH
I HAVE BEEN ANXIOUS OR WORRIED FOR NO GOOD REASON: YES, VERY OFTEN

## 2021-08-06 ASSESSMENT — PAIN DESCRIPTION - PAIN TYPE
TYPE: ACUTE PAIN

## 2021-08-06 ASSESSMENT — PAIN SCALES - GENERAL: PAIN_LEVEL: 0

## 2021-08-06 NOTE — H&P
DATE OF ADMISSION:  2021     ADMISSION DIAGNOSES:  1.  Intrauterine pregnancy at 38+3 weeks' gestation.  2.  Preexisting hypertension.  3.  A2 gestational diabetes mellitus.  4.  Induction of labor.     HISTORY OF PRESENT ILLNESS:  The patient is a 20-year-old  1, para 0 at   38+3 weeks' gestation based upon a 5+3 week ultrasound performed on   2020 who presents to labor and delivery for scheduled induction of labor   secondary to preexisting hypertension and A2 GDM.     At the time of admission, her cervix was 2 cm, 50% effaced, -2 station,   midposition and soft.     She was started on IV Pitocin per protocol.     The patient underwent preeclampsia labs which were within normal limits.     Prenatal care with Dr. Munira Parks, first visit on 2020.  Total visits   15, total weight gain 30 pounds.  Third trimester blood pressures   120-143/73-86.     PRENATAL LABS:  GBS negative on 2021.  Blood type A positive, antibody   screen negative, rubella immune, hepatitis B surface antigen negative, RPR   nonreactive, HIV negative.  SMA, fragile X and CF testing are all negative.    GC, chlamydia negative, negative.  Pap negative for intraepithelial lesion or   malignancy.  One-hour .  Two-hour GTT, fasting 81, 1-hour 166, 2-hour   154.     OBSTETRIC ULTRASOUND:  1.  2020 at 5+3 weeks' gestation, JOHANNA 2021.  2.  2021 at 8+4 weeks' gestation, JOHANNA 2021.  3.  2021 at 20+0 weeks' gestation, JOHANNA 2021.  4.  2021 at 23 weeks' gestation, JOHANNA 2021.  5.  2021 at 27+4 weeks' gestation, JOHANNA 2021.     PAST OBSTETRIC HISTORY:  She is primigravida.     PAST MEDICAL HISTORY:  1.  Morbid obesity with a BMI of 47.  2.  Chronic hypertension.     ISSUES THIS PREGNANCY: Chronic hypertension, morbid obesity, A2 GDM, shortened   cervix.     MEDICATIONS:  She is on prenatal vitamins and insulin.     ALLERGIES:  No known drug allergies.     SURGICAL  HISTORY:  Appendectomy.     SOCIAL HISTORY:  She denies alcohol, tobacco, or drugs of abuse.  The father   of the baby is involved and his name is Eduar.     PHYSICAL EXAMINATION:  VITAL SIGNS:  On admission, blood pressure is 138/80.  GENERAL:  Alert, awake and oriented x3, in no acute distress.  ABDOMEN:  Gravid, vertex by Leopold's, estimated fetal weight is 3500 grams.  PELVIC:  Sterile vaginal exam 2 cm, 50% effaced, -2 station, vertex.  External   fetal monitoring category 1 tracing, reactive without decels. [place]:    Irregular contractions.  EXTREMITIES:  No clubbing, cyanosis or edema.     ASSESSMENT AND PLAN:  A 20-year-old  1, para 0 at 38+3 weeks'   gestation, who presents to labor and delivery for scheduled induction of labor   for A2 GDM and chronic hypertension, who is GBS negative and has a favorable   cervix.     The patient will be started on IV Pitocin per protocol.  She may have an   epidural for labor analgesia.  We will perform amniotomy for labor   augmentation, perform blood sugars every 4 hours when in latent labor and   every 2 hours when in active labor, and we will anticipate a normal   spontaneous vaginal delivery.        ______________________________  MD MICHAEL Pimentel/CRISTHIAN    DD:  2021 19:33  DT:  2021 20:32    Job#:  [id number]

## 2021-08-06 NOTE — CARE PLAN
Problem: Knowledge Deficit - Postpartum  Goal: Patient will verbalize and demonstrate understanding of self and infant care  Outcome: Progressing     Problem: Psychosocial - Postpartum  Goal: Patient will verbalize and demonstrate effective bonding and parenting behavior  Outcome: Progressing     Problem: Altered Physiologic Condition  Goal: Patient physiologically stable as evidenced by normal lochia, palpable uterine involution and vitals within normal limits  Outcome: Progressing     Problem: Infection - Postpartum  Goal: Postpartum patient will be free of signs and symptoms of infection  Outcome: Progressing   The patient is Stable - Low risk of patient condition declining or worsening    Shift Goals  Clinical Goals:  (acceptable pain level)    Progress made toward(s) clinical / shift goals:  MOB verbalized acceptable pain level    Patient is not progressing towards the following goals:

## 2021-08-06 NOTE — ANESTHESIA TIME REPORT
Anesthesia Start and Stop Event Times     Date Time Event    8/5/2021 1944 Ready for Procedure     1950 Anesthesia Start    8/6/2021 0330 Anesthesia Stop        Responsible Staff  08/05/21 to 08/06/21    Name Role Begin End    Clarence Henderson M.D. Anesth 1950 0330        Preop Diagnosis (Free Text):  Pre-op Diagnosis             Preop Diagnosis (Codes):    Post op Diagnosis  Vaginal delivery      Premium Reason  A. 3PM - 7AM    Comments:

## 2021-08-06 NOTE — L&D DELIVERY NOTE
LABOR AND DELIVERY    DELIVERY SUMMARY    STAGE I LABOR:    The patient was admitted as a 21 yo  at 38w3d who presented to labor and delivery for scheduled IOL secondary to preexisting hypertension and A2GDM.  At the time of admission, her cervix was 2/50%/-2.  She was started on IV pitocin per protocol and underwent AROM clear fluid at 1930.  She received an epidural for labor analgesia and was continued on IV pitocin and progressed to completely dilated at 0035 on 2021.  Category I-II tracing during stage I labor.    STAGE II LABOR: 2 hours 55 minutes    I was present for a  of a viable female infant at 0330.  The vertex of the infant delivered without difficulty.  No nuchal cord was palpated and no shoulder dystocia was encountered.   The remainder of the infant delivered without difficulty.  The infant was placed on the mother's abdomen.  The infant's mouth and nose were bulb suctioned and delayed cord clamping was performed and then the cord was clamped and cut.  A segment of cord was clamped and cut for a cord gas but this was held as the APGAR scores were 8 at one minute and 9 at five minutes.    The patient received IV pitocin after delivery of the infant.    STAGE III LABOR: 5 MINUTES    The placenta delivered intact with a 3-vessel cord at 0335.    The patient's perineum was examined and found to have a first degree perineal laceration which was repaired in the usual fashion with 3-0 vicryl.    Bimanual uterine massage and exploration performed and small clots were removed from the lower uterine segment.    The patient received 1000 mcg of cytotec per rectum x 1 after the above examination.    EBL - 300 cc

## 2021-08-06 NOTE — PROGRESS NOTES
1850 Received beside report from Tatianna MEDEROS; Patient is a , EDC of  with a gestational age of 38w3d. RN assuming care of patient; all questions answered; patient resting in bed with significant other and family at bedside, denies needs at this time; vital signs in stable condition; patient educated regarding call light system; call light and patient belongings in reach; patient encouraged to call RN for any needs, wants, desires or concerns. Whiteboard updated. Pitocin running at 16mU and LR running at 125mL/hr.    Patient stated she desires an epidural at this time, consents printed.    Patient adequately given fluid bolus; Dr. Henderson at bedside to place an epidural; patient consented; all questions answered; patient verbalized understanding and patient signed epidural consent.     Time Out. All Parties Agree.    Sterile Prep    Local given.    Epidural placed; negative test dose appreciated; patient tolerate procedure well.    Epidural connected to pump.    Underwood catheter placed; patient tolerated well. Patient resting comfortably in bed, denies needs at this time, call light and patient belongings in reach, encouraged patient to call RN for any needs, wants, desires or concerns.    Dr. Parks updated on patient status, sve, fh pattern, md will be in house in about 20 minutes.   2230 Patient called out stating she is feeling pressure, sve.  2310 Patient called out stating she is feeling even more pressure and it is constant, sve.    0035 sve, complete.   0042 Dr. Parks notified of complete, will start pushing efforts.   0256 Patient desires a short break form pushing, position changed.   0315 Pushing efforts resumed.   0324 Dr. Parks called for delivery.   0300 Delivery of viable female at 38 weeks + 3 days with clear fluid. Dr. Parks delivered, infant to mother chest dried and stimulated with warm towel. Infant in stable condition, apgars 8/9 placed to mother's  chest skin to skin. Delivery bands placed on infant's ankle and wrist by transition RN, band placed on patient's wrist, and second adult band placed on Eduar, father of the baby's wrist.   0335 Placenta delivered, spontaneously and intact. Epidural off.   0341 Cytotec placed by Dr. Parks.  0345 Count correct. Fundus firm.   0420 Dr. Parks clarified with abx orders, RN to discontinue.   0530 Patient requesting to be transferred to postpartum. Patient ambulated to restroom to void, patient unable to at this time void, tolerated well; pericare provided. Unable to transfer at this time, patient tolerated transfer back to bed with linen change.   0700 bedside report given to Eden RN, assumed care, POC discussed, all questions answered; vital signs stable.

## 2021-08-06 NOTE — PROGRESS NOTES
0700-report received from Melody MEDEROS, care assumed, POC discussed  0740-up to BR, voided, pericare done, pads placed, new gown  0815-transfer to , report given to Claude MEDEROS, POC discussed

## 2021-08-06 NOTE — CARE PLAN
Progress made toward(s) clinical / shift goals:   he patient is Watcher - Medium risk of patient condition declining or worsening  Problem: Risk for Infection and Impaired Wound Healing  Goal: Patient will remain free from infection  Outcome: Progressing   RN will assess for signs and symptoms of infection and implement stand precautions and perform hand hygiene before and after patient contact. Patient verbalized understanding for need in proper hand hygiene.    Problem: Risk for Injury  Goal: Patient and fetus will be free of preventable injury/complications  Outcome: Progressing    RN placed EFM and toco to ensure fetal well being and monitor uterine activity. RN educated patient on need for monitors and patient verbalized understanding.

## 2021-08-06 NOTE — ANESTHESIA PROCEDURE NOTES
Epidural Block    Date/Time: 8/5/2021 7:52 PM  Performed by: Clarence Henderson M.D.  Authorized by: Clarence Henderson M.D.     Patient Location:  OB  Start Time:  8/5/2021 7:52 PM  End Time:  8/5/2021 8:04 PM  Reason for Block: labor analgesia    patient identified, IV checked, site marked, risks and benefits discussed, surgical consent, monitors and equipment checked, pre-op evaluation and timeout performed    Patient Position:  Sitting  Prep: ChloraPrep, patient draped and sterile technique    Monitoring:  Blood pressure, continuous pulse oximetry and heart rate  Approach:  Midline  Location:  L3-L4  Injection Technique:  AUGUST air  Skin infiltration:  Lidocaine  Strength:  1%  Dose:  3ml  Needle Type:  Tuohy  Needle Gauge:  17 G  Needle Length:  3.5 in  Loss of resistance::  9  Catheter Size:  19 G  Catheter at Skin Depth:  20  Test Dose Result:  Negative

## 2021-08-06 NOTE — PROGRESS NOTES
1715-Report received, care assumed.  1721-Dr Parks at bedside. SVE 3-4/70/-2. AROM Clear fluid. IUPC and FSE explained to pt and placed. Pt tolerated well. Pads changed. Pt denies any needs at this time.   1850-Report given to Melody MEDEROS

## 2021-08-06 NOTE — L&D DELIVERY NOTE
DATE OF SERVICE:  2021     Stage I labor:  The patient was admitted as a 20-year-old  1, para 0 at   38+3 weeks' gestation based upon a 5+3 week ultrasound performed on   2020, who presented to labor and delivery for scheduled induction of   labor secondary to A2 GDM and pre-existing hypertension.  At the time of   admission, her cervix was 2 cm, 50% effaced, -2 station, midposition and soft.    The patient was noted to be GBS negative.  The patient was started on IV   Pitocin per protocol.  The patient underwent pre-eclampsia labs and blood   sugars, which were normal.  The patient underwent artificial rupture of   membranes of clear fluid at 1930.     The patient received an epidural for labor analgesia.  She was IUPC and scalp   electrode were placed.  The patient progressed to completely dilate at 0035 on   2021.     Category 1-2 tracing during stage I labor.     Stage II labor:  2 hours 55 minutes.     I was present for normal spontaneous vaginal delivery of a viable female   infant at 3:30 a.m. on 2021.     The patient was in [place] for maternal expulsive efforts.  The vertex of   the infant delivered without difficulty.  No nuchal cord was palpated.  The   remainder of the infant delivered without difficulty and no shoulder dystocia   was encountered.  The infant was placed on the mother's abdomen.  The infant's   mouth and nose were bulb suctioned.  Delayed cord clamping was performed for   approximately 3 minutes.  The cord was then clamped and cut.  A segment of   cord was clamped and cut for cord gas, but these were held as Apgar scores   were 8 at 1 minute and 9 at 5 minutes.     The patient received IV Pitocin per protocol after delivery of the infant.     Stage III labor:  5 minutes.  The placenta delivered intact with a 3-vessel   cord at 0335.     The patient's perineum was examined and she was found to have sustained a   first-degree perineal laceration.  This  laceration was repaired in the usual   fashion with 3-0 Vicryl.     Bimanual uterine massage and exploration was performed and small clots were   removed from the lower uterine segment.     The patient received 1000 mcg of Cytotec per rectum x1 after the above   examination.     ESTIMATED BLOOD LOSS:  300 mL.        ______________________________  MD MICHAEL Pimentel/KWAKU    DD:  08/06/2021 08:40  DT:  08/06/2021 11:17    Job#:  [id number]

## 2021-08-07 VITALS
WEIGHT: 270 LBS | HEART RATE: 84 BPM | RESPIRATION RATE: 18 BRPM | BODY MASS INDEX: 42.38 KG/M2 | DIASTOLIC BLOOD PRESSURE: 61 MMHG | HEIGHT: 67 IN | OXYGEN SATURATION: 97 % | SYSTOLIC BLOOD PRESSURE: 123 MMHG | TEMPERATURE: 96.8 F

## 2021-08-07 PROCEDURE — 700102 HCHG RX REV CODE 250 W/ 637 OVERRIDE(OP): Performed by: OBSTETRICS & GYNECOLOGY

## 2021-08-07 PROCEDURE — A9270 NON-COVERED ITEM OR SERVICE: HCPCS | Performed by: OBSTETRICS & GYNECOLOGY

## 2021-08-07 RX ORDER — ACETAMINOPHEN 325 MG/1
325-650 TABLET ORAL EVERY 6 HOURS PRN
COMMUNITY
Start: 2021-08-07 | End: 2021-12-08

## 2021-08-07 RX ORDER — PSEUDOEPHEDRINE HCL 30 MG
100 TABLET ORAL 2 TIMES DAILY PRN
Qty: 60 CAPSULE | COMMUNITY
Start: 2021-08-07 | End: 2021-12-08

## 2021-08-07 RX ORDER — IBUPROFEN 200 MG
200-600 TABLET ORAL EVERY 6 HOURS PRN
COMMUNITY
Start: 2021-08-07 | End: 2021-12-08

## 2021-08-07 RX ADMIN — PRENATAL WITH FERROUS FUM AND FOLIC ACID 1 TABLET: 3080; 920; 120; 400; 22; 1.84; 3; 20; 10; 1; 12; 200; 27; 25; 2 TABLET ORAL at 08:02

## 2021-08-07 ASSESSMENT — PAIN DESCRIPTION - PAIN TYPE
TYPE: ACUTE PAIN
TYPE: ACUTE PAIN

## 2021-08-07 NOTE — PROGRESS NOTES
POSTPARTUM DAY 1    No complaints.  Patient is doing well with infant care and lactation issues.  Patient is voiding well.    PE:    Afebrile  BP normal    Uterus involuting appropriately, nontender  Perineum:  No perineal complaints, so I didn't do specific perineal exam.  Calves nontender, Abran negative bilaterally.     LAB:     8/5/2021 06:50 8/6/2021 12:19   WBC 8.6 13.9 (H)   Hemoglobin 14.1 12.8   Hematocrit 40.6 36.8 (L)   Platelet 187 172        PLAN:  Postpartum care.  Lactation consult.  Patient desires discharge:  today  .    Prescriptions:   PNV, OTC tylenol/ibuprofen PRN .  Followup plans:   6 wks .

## 2021-08-07 NOTE — PROGRESS NOTES
Bedside report received from sergio RN. 12hr chart check complete, MAR and orders reviewed. Jace Schultz R.N.

## 2021-08-07 NOTE — LACTATION NOTE
This note was copied from a baby's chart.  MOB states she has decided to exclusively formula feed, denies having any additional questions or concerns for LC at this time

## 2021-08-07 NOTE — CARE PLAN
Problem: Psychosocial - Postpartum  Goal: Patient will verbalize and demonstrate effective bonding and parenting behavior  Outcome: Progressing     Problem: Altered Physiologic Condition  Goal: Patient physiologically stable as evidenced by normal lochia, palpable uterine involution and vitals within normal limits  Outcome: Progressing     Problem: Infection - Postpartum  Goal: Postpartum patient will be free of signs and symptoms of infection  Outcome: Progressing     The patient is Stable - Low risk of patient condition declining or worsening    Shift Goals  Clinical Goals: lochia wnl, pain control    Progress made toward(s) clinical / shift goals:  Pain well controlled with ibuprofen at this time, denies further needs. Pt performing jose f care independently. Lochia light without clots, vitals stable. Good bonding observed, asking appropriate questions, continuing to monitor.

## 2021-08-07 NOTE — CARE PLAN
The patient is Stable - Low risk of patient condition declining or worsening    Shift Goals  Clinical Goals: remain clinically stable    Progress made toward(s) clinical / shift goals:  Patient has scant to light lochia with a firm palpable uterus.  Vital signs are within defined limits.  Assessment will continue.  Patient will ask for pain medication when needed.  Pain assessment will continue.     Patient is not progressing towards the following goals:

## 2021-08-07 NOTE — DISCHARGE INSTRUCTIONS
PATIENT DISCHARGE EDUCATION INSTRUCTION SHEET  REASONS TO CALL YOUR OBSTETRICIAN  · Persistent fever, shaking, chills (Temperature higher than 100.4) may indicate you have an infection  · Heavy bleeding: soaking more than 1 pad per hour; Passing clots an egg-sized clot or bigger may mean you have an postpartum hemorrhage  · Foul odor from vagina or bad smelling or discolored discharge or blood  · Breast infection (Mastitis symptoms); breast pain, chills, fever, redness or red streaks, may feel flu like symptoms  · Urinary pain, burning or frequency  · Incision that is not healing, increased redness, swelling, tenderness or pain, or any pus from episiotomy or  site may mean you have an infection  · Redness, swelling, warmth, or painful to touch in the calf area of your leg may mean you have a blood clot  · Severe or intensified depression, thoughts or feelings of wanting to hurt yourself or someone else   · Pain in chest, obstructed breathing or shortness of breath (trouble catching your breath) may mean you are having a postpartum complication. Call your provider immediately   · Headache that does not get better, even after taking medicine, a bad headache with vision changes or pain in the upper right area of your belly may mean you have high blood pressure or post birth preeclampsia. Call your provider immediately    HAND WASHING  All family and friends should wash their hands:  · Before and after holding the baby  · Before feeding the baby  · After using the restroom or changing the baby's diaper    WOUND CARE  Ask your physician for additional care instructions. In general:  ·  Incision:  · May shower and pat incision dry   · Keep the incision clean and dry  · There should not be any opening or pus from the incision  · Continue to walk at home 3 times a day   · Do NOT lift anything heavier than your baby (over 10 pounds)  · Encourage family to help participate in care of the  to allow  rest and mom time to heal  · Episiotomy/Laceration  · May use jose f-spray bottle, witch hazel pads and dermaplast spray for comfort  · Use jose f-spray bottle after urinating to cleanse perineal area  · To prevent burning during urination spray jose f-water bottle on labial area   · Pat perineal area dry until episiotomy/laceration is healed  · Continue to use jose f-bottle until bleeding stops as needed  · If have a 2nd degree laceration or greater, a Sitz bath can offer relief from soreness, burning, and inflammation   · Sitz Bath   · Sit in 6 inches of warm water and soak laceration as needed until the laceration heals    VAGINAL CARE AND BLEEDING  · Nothing inside vagina for 6 weeks:   · No sexual intercourse, tampons or douching  · Bleeding may continue for 2-4 weeks. Amount and color may vary  · Soaking 1 pad or more in an hour for several hours is considered heavy bleeding  · Passing large egg sized blood clots can be concerning  · If you feel like you have heavy bleeding or are having increasing amount of blood clots call your Obstetrician immediately  · If you begin feeling faint upon standing, feeling sick to your stomach, have clammy skin, a really fast heartbeat, have chills, start feeling confused, dizzy, sleepy or weak, or feeling like you're going to faint call your Obstetrician immediately    HYPERTENSION   Preeclampsia or gestational hypertension are types of high blood pressure that only pregnant women can get. It is important for you to be aware of symptoms to seek early intervention and treatment. If you have any of these symptoms immediately call your Obstetrician    · Vision changes or blurred vision   · Severe headache or pain that is unrelieved with medication and will not go away  · Persistent pain in upper abdomen or shoulder   · Increased swelling of face, feet, or hands  · Difficulty breathing or shortness of breath at rest  · Urinating less than usual    URINATION AND BOWEL MOVEMENTS  · Eating  "more fiber (bran cereal, fruits, and vegetables) and drinking plenty of fluids will help to avoid constipation  · Urinary frequency and urgency after childbirth is normal  · If you experience any urinary pain, burning or frequency call your provider    BIRTH CONTROL  · It is possible to become pregnant at any time after delivery and while breastfeeding  · Plan to discuss a method of birth control with your physician at your post delivery follow up visit    POSTPARTUM BLUES  During the first few days after birth, you may experience a sense of the \"blues\" which may include impatience, irritability or even crying. These feelings come and go quickly. However, as many as 1 in 10 women experience emotional symptoms known as postpartum depression.     POSTPARTUM DEPRESSION    May start as early as the second or third day after delivery or take several weeks or months to develop. Symptoms of \"blues\" are present, but are more intense: Crying spells; loss of appetite; feelings of hopelessness or loss of control; fear of touching the baby; over concern or no concern at all about the baby; little or no concern about your own appearance/caring for yourself; and/or inability to sleep or excessive sleeping. Contact your Obstetrician if you are experiencing any of these symptoms     PREVENTING SHAKEN BABY  If you are angry or stressed, PUT THE BABY IN THE CRIB, step away, take some deep breaths, and wait until you are calm to care for the baby. DO NOT SHAKE THE BABY. You are not alone, call a supporter for help.  · [facility] 24/7 crisis call line (453-599-0372) or (1-121.892.6360)  · You can also text them, text \"ANSWER\" (602975)      "

## 2021-08-19 NOTE — DISCHARGE SUMMARY
"DISCHARGE SUMMARY    DATE OF ADMISSION: 2021.    DATE OF DISCHARGE: 2021.    ADMISSION DIAGNOSIS:    1.  Intrauterine pregnancy at 38+3 weeks' gestation.  2.  Preexisting hypertension.  3.  A2 gestational diabetes mellitus.  4.  Induction of labor.    DISCHARGE DIAGNOSIS:    1.  As above.  2. S/P  and first degree perineal laceration.    S: Doing well.  Pain is well controlled.  Ambulating, voiding spontaneously, and tolerating a regular diet.  Moderate lochia.  Working on breast feeding.  Desires discharge to home today.    O:  /61   Pulse 84   Temp 36 °C (96.8 °F) (Temporal)   Resp 18   Ht 1.702 m (5' 7\")   Wt 122 kg (270 lb)   SpO2 97%     A, A, and O x 3 NAD    FF u/1    No c/c/e     Results for ZENAIDA NAVARRO (MRN [id number]) as of 2021 08:03   Ref. Range 2021 00:49 2021 12:19   WBC Latest Ref Range: 4.8 - 10.8 K/uL  13.9 (H)   RBC Latest Ref Range: 4.20 - 5.40 M/uL  4.34   Hemoglobin Latest Ref Range: 12.0 - 16.0 g/dL  12.8   Hematocrit Latest Ref Range: 37.0 - 47.0 %  36.8 (L)   MCV Latest Ref Range: 81.4 - 97.8 fL  84.8   MCH Latest Ref Range: 27.0 - 33.0 pg  29.5   MCHC Latest Ref Range: 33.6 - 35.0 g/dL  34.8   RDW Latest Ref Range: 35.9 - 50.0 fL  41.4   Platelet Count Latest Ref Range: 164 - 446 K/uL  172   MPV Latest Ref Range: 9.0 - 12.9 fL  11.8   Glucose - Accu-Ck Latest Ref Range: 65 - 99 mg/dL 79      A/P: PPD #1    1.  D/C to home.  2.  Follow up in 2 weeks for a BP check and 6 weeks for a postpartum examination.  Will need a 6 week 2 hour GTT test to evaluate for overt GDM.  3.  ADAT.  4.  Continue prenatal vitamins while breast feeding.  5.  Ambulate TID.  6.  D/C medications: ibuprofen and colace.  "

## 2021-10-07 ENCOUNTER — HOSPITAL ENCOUNTER (OUTPATIENT)
Dept: LAB | Facility: MEDICAL CENTER | Age: 21
End: 2021-10-07
Attending: OBSTETRICS & GYNECOLOGY
Payer: COMMERCIAL

## 2021-10-07 LAB
GLUCOSE 1.5H P CHAL SERPL-MCNC: 122 MG/DL (ref 65–139)
GLUCOSE 1H P CHAL SERPL-MCNC: 129 MG/DL (ref 65–199)
GLUCOSE 2H P CHAL SERPL-MCNC: 99 MG/DL (ref 65–139)
GLUCOSE 30M P CHAL SERPL-MCNC: 147 MG/DL (ref 65–199)
GLUCOSE BS SERPL-MCNC: 96 MG/DL (ref 65–99)

## 2021-10-07 PROCEDURE — 82951 GLUCOSE TOLERANCE TEST (GTT): CPT

## 2021-10-07 PROCEDURE — 36415 COLL VENOUS BLD VENIPUNCTURE: CPT

## 2021-10-07 PROCEDURE — 82952 GTT-ADDED SAMPLES: CPT | Mod: 91

## 2021-12-08 ENCOUNTER — OFFICE VISIT (OUTPATIENT)
Dept: URGENT CARE | Facility: PHYSICIAN GROUP | Age: 21
End: 2021-12-08
Payer: COMMERCIAL

## 2021-12-08 VITALS
WEIGHT: 200 LBS | HEART RATE: 74 BPM | BODY MASS INDEX: 31.39 KG/M2 | TEMPERATURE: 97.6 F | HEIGHT: 67 IN | RESPIRATION RATE: 20 BRPM | OXYGEN SATURATION: 99 % | SYSTOLIC BLOOD PRESSURE: 124 MMHG | DIASTOLIC BLOOD PRESSURE: 74 MMHG

## 2021-12-08 DIAGNOSIS — K92.1 BLOOD IN STOOL: ICD-10-CM

## 2021-12-08 PROCEDURE — 99214 OFFICE O/P EST MOD 30 MIN: CPT | Performed by: FAMILY MEDICINE

## 2021-12-08 ASSESSMENT — FIBROSIS 4 INDEX: FIB4 SCORE: 0.55

## 2021-12-09 NOTE — PROGRESS NOTES
"Chief Complaint   Patient presents with   • Stool Color Change     Pt states that she has blood in stool; just today.          HPI:      Pt noted some blood in stool x 1 today.   She states it was \"large\" amount.   She does note some lower abd pain, that has been getting progressively worse, although admits pain is still mild in intensity.   Diet unchanged and she denies any wt loss.   Denies constipation or diarrhea.       She is 3 months s/p  Induced  complicated by GDM and hypertension - insulin during pregnancy.     But she did not have hemmorhoids at pregnancy.        Past Medical History:   Diagnosis Date   • Ingrown nail    • Strep throat      Social History     Tobacco Use   • Smoking status: Never Smoker   • Smokeless tobacco: Never Used   Vaping Use   • Vaping Use: Never used   Substance Use Topics   • Alcohol use: No   • Drug use: No         Review of Systems   Constitutional: Negative for fever, chills and malaise/fatigue.   Eyes: Negative for vision changes, d/c.    Respiratory: Negative for cough and sputum production.    Cardiovascular: Negative for chest pain and palpitations.   Gastrointestinal: Negative for nausea, vomiting, abdominal pain, diarrhea and constipation.   Genitourinary: Negative for dysuria, urgency and frequency.   Skin: Negative for rash or  itching.   Neurological: Negative for dizziness and tingling.   Psychiatric/Behavioral: Negative for depression.   Hematologic/lymphatic - denies bruising or excessive bleeding  All other systems reviewed and are negative.        OBJECTIVE  /74 (BP Location: Left arm, Patient Position: Sitting, BP Cuff Size: Adult)   Pulse 74   Temp 36.4 °C (97.6 °F) (Temporal)   Resp 20   Ht 1.702 m (5' 7\")   Wt 90.7 kg (200 lb)   SpO2 99%   HEENT - PERRLA, EOMI  Neuro - alert and oriented x3. CN 2-12 grossly intact.  Lungs - CTA. No wheezes, rhonchi or rales.  Heart - regular rate and rhythm without murmur.  Abdomen - soft and non-tender, bowel " sounds active x4.   No hepatosplenomegaly  Rectal - exam performed with chaperone present ().   No ext hemmorids or masses noted.   No fissures.   Normal rectal tone.    No TTP.     Heme was positive.      A/P:    1. Blood in stool      - Referral to Gastroenterology  - CBC WITH DIFFERENTIAL; Future        My total time spent caring for the patient on the day of the encounter was at least 30 minutes.   This does not include time spent on separately billable procedures/tests.

## 2022-04-13 ENCOUNTER — OFFICE VISIT (OUTPATIENT)
Dept: OCCUPATIONAL MEDICINE | Facility: CLINIC | Age: 22
End: 2022-04-13
Payer: COMMERCIAL

## 2022-04-13 ENCOUNTER — EH NON-PROVIDER (OUTPATIENT)
Dept: OCCUPATIONAL MEDICINE | Facility: CLINIC | Age: 22
End: 2022-04-13
Payer: COMMERCIAL

## 2022-04-13 ENCOUNTER — HOSPITAL ENCOUNTER (OUTPATIENT)
Facility: MEDICAL CENTER | Age: 22
End: 2022-04-13
Attending: PREVENTIVE MEDICINE
Payer: COMMERCIAL

## 2022-04-13 VITALS
SYSTOLIC BLOOD PRESSURE: 110 MMHG | BODY MASS INDEX: 38.61 KG/M2 | HEART RATE: 78 BPM | HEIGHT: 67 IN | DIASTOLIC BLOOD PRESSURE: 72 MMHG | RESPIRATION RATE: 16 BRPM | WEIGHT: 246 LBS

## 2022-04-13 DIAGNOSIS — Z02.89 ENCOUNTER FOR OCCUPATIONAL HEALTH ASSESSMENT: ICD-10-CM

## 2022-04-13 DIAGNOSIS — Z02.1 PRE-EMPLOYMENT HEALTH SCREENING EXAMINATION: ICD-10-CM

## 2022-04-13 DIAGNOSIS — Z02.1 PRE-EMPLOYMENT DRUG SCREENING: ICD-10-CM

## 2022-04-13 LAB
AMP AMPHETAMINE: NORMAL
BAR BARBITURATES: NORMAL
BZO BENZODIAZEPINES: NORMAL
COC COCAINE: NORMAL
INT CON NEG: NORMAL
INT CON POS: NORMAL
MDMA ECSTASY: NORMAL
MET METHAMPHETAMINES: NORMAL
MTD METHADONE: NORMAL
OPI OPIATES: NORMAL
OXY OXYCODONE: NORMAL
PCP PHENCYCLIDINE: NORMAL
POC URINE DRUG SCREEN OCDRS: NEGATIVE
THC: NORMAL

## 2022-04-13 PROCEDURE — 80305 DRUG TEST PRSMV DIR OPT OBS: CPT | Performed by: PREVENTIVE MEDICINE

## 2022-04-13 PROCEDURE — 86480 TB TEST CELL IMMUN MEASURE: CPT | Performed by: PREVENTIVE MEDICINE

## 2022-04-13 PROCEDURE — 8915 PR COMPREHENSIVE PHYSICAL: Performed by: NURSE PRACTITIONER

## 2022-04-13 PROCEDURE — 90716 VAR VACCINE LIVE SUBQ: CPT | Performed by: NURSE PRACTITIONER

## 2022-04-13 ASSESSMENT — FIBROSIS 4 INDEX: FIB4 SCORE: 0.55

## 2022-04-15 LAB
GAMMA INTERFERON BACKGROUND BLD IA-ACNC: 0.03 IU/ML
M TB IFN-G BLD-IMP: NEGATIVE
M TB IFN-G CD4+ BCKGRND COR BLD-ACNC: 0 IU/ML
MITOGEN IGNF BCKGRD COR BLD-ACNC: >10 IU/ML
QFT TB2 - NIL TBQ2: -0.01 IU/ML

## 2022-06-01 ENCOUNTER — EH NON-PROVIDER (OUTPATIENT)
Dept: OCCUPATIONAL MEDICINE | Facility: CLINIC | Age: 22
End: 2022-06-01
Payer: COMMERCIAL

## 2022-10-02 ENCOUNTER — HOSPITAL ENCOUNTER (OUTPATIENT)
Dept: RADIOLOGY | Facility: MEDICAL CENTER | Age: 22
End: 2022-10-02
Attending: OBSTETRICS & GYNECOLOGY
Payer: COMMERCIAL

## 2022-10-02 DIAGNOSIS — N93.9 VAGINAL HEMORRHAGE: ICD-10-CM

## 2022-10-02 DIAGNOSIS — N94.10 COITUS PAINFUL FOR FEMALE: ICD-10-CM

## 2022-10-02 DIAGNOSIS — R10.2 ADNEXAL TENDERNESS, RIGHT: ICD-10-CM

## 2022-10-02 PROCEDURE — 76830 TRANSVAGINAL US NON-OB: CPT

## 2022-10-07 ENCOUNTER — HOSPITAL ENCOUNTER (OUTPATIENT)
Facility: MEDICAL CENTER | Age: 22
End: 2022-10-07
Attending: OBSTETRICS & GYNECOLOGY
Payer: COMMERCIAL

## 2022-10-07 PROCEDURE — 87624 HPV HI-RISK TYP POOLED RSLT: CPT

## 2022-10-07 PROCEDURE — 87661 TRICHOMONAS VAGINALIS AMPLIF: CPT

## 2022-10-07 PROCEDURE — 87591 N.GONORRHOEAE DNA AMP PROB: CPT

## 2022-10-07 PROCEDURE — 87491 CHLMYD TRACH DNA AMP PROBE: CPT

## 2022-10-07 PROCEDURE — 88175 CYTOPATH C/V AUTO FLUID REDO: CPT

## 2022-10-11 LAB
C TRACH DNA GENITAL QL NAA+PROBE: NEGATIVE
CYTOLOGY REG CYTOL: NORMAL
HPV HR 12 DNA CVX QL NAA+PROBE: NEGATIVE
HPV16 DNA SPEC QL NAA+PROBE: NEGATIVE
HPV18 DNA SPEC QL NAA+PROBE: NEGATIVE
N GONORRHOEA DNA GENITAL QL NAA+PROBE: NEGATIVE
SPECIMEN SOURCE: NORMAL
SPECIMEN SOURCE: NORMAL

## 2022-10-13 LAB
SPEC CONTAINER SPEC: NORMAL
SPECIMEN SOURCE: NORMAL
T VAGINALIS RRNA SPEC QL NAA+PROBE: NEGATIVE

## 2022-10-29 NOTE — ANESTHESIA POSTPROCEDURE EVALUATION
Patient: Salena Anderson    Procedure Summary     Date: 08/05/21 Room / Location:     Anesthesia Start: 1950 Anesthesia Stop: 08/06/21 0330    Procedure: Labor Epidural Diagnosis:     Scheduled Providers:  Responsible Provider: Clarence Henderson M.D.    Anesthesia Type: epidural ASA Status: 2          Final Anesthesia Type: epidural  Last vitals  BP   Blood Pressure: 143/65    Temp   37.7 °C (99.8 °F)    Pulse   96   Resp   16    SpO2   96 %      Anesthesia Post Evaluation    Patient location during evaluation: floor  Patient participation: complete - patient participated  Level of consciousness: awake and alert  Pain score: 0    Airway patency: patent  Anesthetic complications: no  Cardiovascular status: hemodynamically stable  Respiratory status: acceptable  Hydration status: euvolemic    PONV: none          No complications documented.     Nurse Pain Score: 0 (NPRS)         This document is complete and the patient is ready for discharge.

## 2022-12-06 ENCOUNTER — OFFICE VISIT (OUTPATIENT)
Dept: URGENT CARE | Facility: PHYSICIAN GROUP | Age: 22
End: 2022-12-06
Payer: COMMERCIAL

## 2022-12-06 VITALS
TEMPERATURE: 97.7 F | HEIGHT: 67 IN | BODY MASS INDEX: 39.55 KG/M2 | DIASTOLIC BLOOD PRESSURE: 68 MMHG | OXYGEN SATURATION: 96 % | HEART RATE: 102 BPM | RESPIRATION RATE: 18 BRPM | SYSTOLIC BLOOD PRESSURE: 120 MMHG | WEIGHT: 252 LBS

## 2022-12-06 DIAGNOSIS — J01.00 ACUTE NON-RECURRENT MAXILLARY SINUSITIS: ICD-10-CM

## 2022-12-06 LAB
FLUAV+FLUBV AG SPEC QL IA: NEGATIVE
INT CON NEG: NORMAL
INT CON POS: NORMAL

## 2022-12-06 PROCEDURE — 99213 OFFICE O/P EST LOW 20 MIN: CPT | Performed by: PHYSICIAN ASSISTANT

## 2022-12-06 PROCEDURE — 87804 INFLUENZA ASSAY W/OPTIC: CPT | Performed by: PHYSICIAN ASSISTANT

## 2022-12-06 RX ORDER — AMOXICILLIN AND CLAVULANATE POTASSIUM 875; 125 MG/1; MG/1
1 TABLET, FILM COATED ORAL 2 TIMES DAILY
Qty: 14 TABLET | Refills: 0 | Status: SHIPPED | OUTPATIENT
Start: 2022-12-06 | End: 2022-12-13

## 2022-12-06 ASSESSMENT — FIBROSIS 4 INDEX: FIB4 SCORE: 0.57

## 2022-12-06 NOTE — PROGRESS NOTES
"Subjective:   Salena Anderson is a 22 y.o. female who presents for Headache (X 2 weeks headache, x3 days ear pain, congestion nasal, cough)      HPI  The patient presents to the Urgent Care with sinus symptoms.  Onset of symptoms 2 weeks ago.  Symptoms started with flulike symptoms.  She has had a constant headache for 3 days and frontal pressure and nose pressure she states.  Pain to her ears as well.  She has had nasal congestion the entire time.  Heavy chest.  Headache will not resolve with medication.  Sort of dizzy today while looking at the computer.  COVID test 2 days ago which was negative.  She felt as if she was getting better at 1 point and then worsened several days ago.  Associated chills and cough. Denies any fever, chest pain, shortness of breath, vomiting, diarrhea. Influenza vaccine not up to date.       Medications:    This patient does not have an active medication from one of the medication groupers.    Allergies: Patient has no known allergies.    Problem List: Salena Anderson does not have a problem list on file.    Surgical History:  Past Surgical History:   Procedure Laterality Date    APPENDECTOMY         Past Social Hx: Salena Anderson  reports that she has never smoked. She has never used smokeless tobacco. She reports that she does not drink alcohol and does not use drugs.     Past Family Hx:  Salena Anderson family history is not on file.     Problem list, medications, and allergies reviewed by myself today in Epic.     Objective:     /68 (BP Location: Left arm, Patient Position: Sitting, BP Cuff Size: Adult)   Pulse (!) 102   Temp 36.5 °C (97.7 °F) (Temporal)   Resp 18   Ht 1.702 m (5' 7\")   Wt 114 kg (252 lb)   SpO2 96%   BMI 39.47 kg/m²     Physical Exam  Vitals reviewed.   Constitutional:       General: She is not in acute distress.     Appearance: Normal appearance. She is not ill-appearing or toxic-appearing.   HENT:      Head: Normocephalic.      Right Ear: Tympanic " membrane normal.      Left Ear: Tympanic membrane normal.      Nose: Mucosal edema and rhinorrhea present. Rhinorrhea is purulent.      Right Sinus: Maxillary sinus tenderness present.      Left Sinus: Maxillary sinus tenderness present.      Mouth/Throat:      Mouth: Mucous membranes are moist.      Pharynx: Oropharynx is clear.   Eyes:      Conjunctiva/sclera: Conjunctivae normal.      Pupils: Pupils are equal, round, and reactive to light.   Cardiovascular:      Rate and Rhythm: Normal rate and regular rhythm.      Heart sounds: Normal heart sounds.   Pulmonary:      Effort: Pulmonary effort is normal. No respiratory distress.      Breath sounds: Normal breath sounds. No wheezing, rhonchi or rales.   Musculoskeletal:      Cervical back: Neck supple.   Lymphadenopathy:      Cervical: No cervical adenopathy.   Skin:     General: Skin is warm and dry.   Neurological:      General: No focal deficit present.      Mental Status: She is alert and oriented to person, place, and time.   Psychiatric:         Mood and Affect: Mood normal.         Behavior: Behavior normal.       Diagnosis and associated orders:     1. Acute non-recurrent maxillary sinusitis  - amoxicillin-clavulanate (AUGMENTIN) 875-125 MG Tab; Take 1 Tablet by mouth 2 times a day for 7 days.  Dispense: 14 Tablet; Refill: 0     Comments/MDM:     Patient's presenting symptoms and exam findings are consistent with bacterial sinusitis.  Start Augmentin  I recommend plenty of fluids, Flonase nasal spray, nasal saline washes or gary pot, Mucinex decongestant, Ibuprofen or Tylenol for pain, non-drowsy antihistamine such as Zyrtec or Claritin.          I personally reviewed prior external notes and test results pertinent to today's visit. Pathogenesis of diagnosis discussed including typical length and natural progression. Supportive care, natural history, differential diagnoses, and indications for immediate follow-up discussed. Patient expresses understanding  and agrees to plan. Patient denies any other questions or concerns.     Follow-up with the primary care physician for recheck, reevaluation, and consideration of further management.    Please note that this dictation was created using voice recognition software. I have made a reasonable attempt to correct obvious errors, but I expect that there are errors of grammar and possibly content that I did not discover before finalizing the note.    This note was electronically signed by Barber Anaya PA-C

## 2022-12-06 NOTE — LETTER
December 6, 2022         Patient: Salena Anderson   YOB: 2000   Date of Visit: 12/6/2022           To Whom it May Concern:    Salena Anderson was seen in my clinic on 12/6/2022. She may return to work on 12/08    If you have any questions or concerns, please don't hesitate to call.        Sincerely,           Barber Anaya P.A.-C.  Electronically Signed

## 2022-12-25 ENCOUNTER — HOSPITAL ENCOUNTER (OUTPATIENT)
Facility: MEDICAL CENTER | Age: 22
End: 2022-12-25
Attending: STUDENT IN AN ORGANIZED HEALTH CARE EDUCATION/TRAINING PROGRAM
Payer: COMMERCIAL

## 2022-12-25 ENCOUNTER — OFFICE VISIT (OUTPATIENT)
Dept: URGENT CARE | Facility: PHYSICIAN GROUP | Age: 22
End: 2022-12-25
Payer: COMMERCIAL

## 2022-12-25 VITALS
SYSTOLIC BLOOD PRESSURE: 118 MMHG | WEIGHT: 249 LBS | DIASTOLIC BLOOD PRESSURE: 78 MMHG | BODY MASS INDEX: 39.08 KG/M2 | HEART RATE: 82 BPM | RESPIRATION RATE: 16 BRPM | OXYGEN SATURATION: 99 % | HEIGHT: 67 IN | TEMPERATURE: 97.2 F

## 2022-12-25 DIAGNOSIS — J06.9 VIRAL URI WITH COUGH: ICD-10-CM

## 2022-12-25 LAB
FLUAV RNA SPEC QL NAA+PROBE: POSITIVE
FLUBV RNA SPEC QL NAA+PROBE: NEGATIVE
RSV RNA SPEC QL NAA+PROBE: NEGATIVE
SARS-COV-2 RNA RESP QL NAA+PROBE: NOTDETECTED
SPECIMEN SOURCE: ABNORMAL

## 2022-12-25 PROCEDURE — 99213 OFFICE O/P EST LOW 20 MIN: CPT | Performed by: STUDENT IN AN ORGANIZED HEALTH CARE EDUCATION/TRAINING PROGRAM

## 2022-12-25 PROCEDURE — 0241U HCHG SARS-COV-2 COVID-19 NFCT DS RESP RNA 4 TRGT MIC: CPT

## 2022-12-25 RX ORDER — BENZONATATE 100 MG/1
100 CAPSULE ORAL 3 TIMES DAILY PRN
Qty: 60 CAPSULE | Refills: 0 | Status: SHIPPED | OUTPATIENT
Start: 2022-12-25 | End: 2023-05-11

## 2022-12-25 ASSESSMENT — ENCOUNTER SYMPTOMS
MYALGIAS: 1
PALPITATIONS: 0
NAUSEA: 0
VOMITING: 0
COUGH: 1
ABDOMINAL PAIN: 0
SORE THROAT: 0
FEVER: 1
CONSTIPATION: 0
SHORTNESS OF BREATH: 0
WHEEZING: 0
CHILLS: 1
DIARRHEA: 0

## 2022-12-25 ASSESSMENT — FIBROSIS 4 INDEX: FIB4 SCORE: 0.57

## 2023-05-11 ENCOUNTER — HOSPITAL ENCOUNTER (OUTPATIENT)
Dept: LAB | Facility: MEDICAL CENTER | Age: 23
End: 2023-05-11
Attending: STUDENT IN AN ORGANIZED HEALTH CARE EDUCATION/TRAINING PROGRAM
Payer: COMMERCIAL

## 2023-05-11 ENCOUNTER — OFFICE VISIT (OUTPATIENT)
Dept: MEDICAL GROUP | Facility: MEDICAL CENTER | Age: 23
End: 2023-05-11
Payer: COMMERCIAL

## 2023-05-11 VITALS
WEIGHT: 258 LBS | DIASTOLIC BLOOD PRESSURE: 72 MMHG | BODY MASS INDEX: 40.49 KG/M2 | OXYGEN SATURATION: 96 % | SYSTOLIC BLOOD PRESSURE: 130 MMHG | TEMPERATURE: 97.2 F | RESPIRATION RATE: 16 BRPM | HEIGHT: 67 IN | HEART RATE: 79 BPM

## 2023-05-11 DIAGNOSIS — Z11.59 NEED FOR HEPATITIS C SCREENING TEST: ICD-10-CM

## 2023-05-11 DIAGNOSIS — R73.01 IFG (IMPAIRED FASTING GLUCOSE): ICD-10-CM

## 2023-05-11 DIAGNOSIS — E66.01 CLASS 3 SEVERE OBESITY DUE TO EXCESS CALORIES WITHOUT SERIOUS COMORBIDITY WITH BODY MASS INDEX (BMI) OF 40.0 TO 44.9 IN ADULT (HCC): ICD-10-CM

## 2023-05-11 DIAGNOSIS — Z23 NEED FOR VACCINATION: ICD-10-CM

## 2023-05-11 LAB
ALBUMIN SERPL BCP-MCNC: 4.4 G/DL (ref 3.2–4.9)
ALBUMIN/GLOB SERPL: 1.3 G/DL
ALP SERPL-CCNC: 69 U/L (ref 30–99)
ALT SERPL-CCNC: 28 U/L (ref 2–50)
ANION GAP SERPL CALC-SCNC: 10 MMOL/L (ref 7–16)
AST SERPL-CCNC: 19 U/L (ref 12–45)
BILIRUB SERPL-MCNC: 0.7 MG/DL (ref 0.1–1.5)
BUN SERPL-MCNC: 8 MG/DL (ref 8–22)
CALCIUM ALBUM COR SERPL-MCNC: 8.9 MG/DL (ref 8.5–10.5)
CALCIUM SERPL-MCNC: 9.2 MG/DL (ref 8.5–10.5)
CHLORIDE SERPL-SCNC: 105 MMOL/L (ref 96–112)
CHOLEST SERPL-MCNC: 156 MG/DL (ref 100–199)
CO2 SERPL-SCNC: 23 MMOL/L (ref 20–33)
CREAT SERPL-MCNC: 0.43 MG/DL (ref 0.5–1.4)
ERYTHROCYTE [DISTWIDTH] IN BLOOD BY AUTOMATED COUNT: 37.6 FL (ref 35.9–50)
EST. AVERAGE GLUCOSE BLD GHB EST-MCNC: 100 MG/DL
GFR SERPLBLD CREATININE-BSD FMLA CKD-EPI: 139 ML/MIN/1.73 M 2
GLOBULIN SER CALC-MCNC: 3.3 G/DL (ref 1.9–3.5)
GLUCOSE SERPL-MCNC: 105 MG/DL (ref 65–99)
HBA1C MFR BLD: 5.1 % (ref 4–5.6)
HCT VFR BLD AUTO: 46.4 % (ref 37–47)
HCV AB SER QL: NORMAL
HDLC SERPL-MCNC: 29 MG/DL
HGB BLD-MCNC: 15.6 G/DL (ref 12–16)
LDLC SERPL CALC-MCNC: 108 MG/DL
MCH RBC QN AUTO: 29.1 PG (ref 27–33)
MCHC RBC AUTO-ENTMCNC: 33.6 G/DL (ref 33.6–35)
MCV RBC AUTO: 86.6 FL (ref 81.4–97.8)
PLATELET # BLD AUTO: 324 K/UL (ref 164–446)
PMV BLD AUTO: 10.7 FL (ref 9–12.9)
POTASSIUM SERPL-SCNC: 4 MMOL/L (ref 3.6–5.5)
PROT SERPL-MCNC: 7.7 G/DL (ref 6–8.2)
RBC # BLD AUTO: 5.36 M/UL (ref 4.2–5.4)
SODIUM SERPL-SCNC: 138 MMOL/L (ref 135–145)
T4 FREE SERPL-MCNC: 1.24 NG/DL (ref 0.93–1.7)
TRIGL SERPL-MCNC: 97 MG/DL (ref 0–149)
TSH SERPL DL<=0.005 MIU/L-ACNC: 2.36 UIU/ML (ref 0.38–5.33)
WBC # BLD AUTO: 7.7 K/UL (ref 4.8–10.8)

## 2023-05-11 PROCEDURE — 36415 COLL VENOUS BLD VENIPUNCTURE: CPT

## 2023-05-11 PROCEDURE — 99213 OFFICE O/P EST LOW 20 MIN: CPT | Mod: 25 | Performed by: STUDENT IN AN ORGANIZED HEALTH CARE EDUCATION/TRAINING PROGRAM

## 2023-05-11 PROCEDURE — 86803 HEPATITIS C AB TEST: CPT

## 2023-05-11 PROCEDURE — 84443 ASSAY THYROID STIM HORMONE: CPT

## 2023-05-11 PROCEDURE — 83036 HEMOGLOBIN GLYCOSYLATED A1C: CPT

## 2023-05-11 PROCEDURE — 90651 9VHPV VACCINE 2/3 DOSE IM: CPT | Performed by: STUDENT IN AN ORGANIZED HEALTH CARE EDUCATION/TRAINING PROGRAM

## 2023-05-11 PROCEDURE — 80053 COMPREHEN METABOLIC PANEL: CPT

## 2023-05-11 PROCEDURE — 80061 LIPID PANEL: CPT

## 2023-05-11 PROCEDURE — 84439 ASSAY OF FREE THYROXINE: CPT

## 2023-05-11 PROCEDURE — 85027 COMPLETE CBC AUTOMATED: CPT

## 2023-05-11 PROCEDURE — 90471 IMMUNIZATION ADMIN: CPT | Performed by: STUDENT IN AN ORGANIZED HEALTH CARE EDUCATION/TRAINING PROGRAM

## 2023-05-11 RX ORDER — ETONOGESTREL 68 MG/1
1 IMPLANT SUBCUTANEOUS ONCE
COMMUNITY

## 2023-05-11 ASSESSMENT — PATIENT HEALTH QUESTIONNAIRE - PHQ9: CLINICAL INTERPRETATION OF PHQ2 SCORE: 0

## 2023-05-11 ASSESSMENT — FIBROSIS 4 INDEX: FIB4 SCORE: 0.57

## 2023-05-11 NOTE — PROGRESS NOTES
"Subjective:     Chief Complaint   Patient presents with    Establish Care    Requesting Labs     General         HPI:   Salena presents today to establish care.  No previous PCP.    Gestational diabetes  Patient presents today for concern about diabetes.  Patient notes that she had gestational diabetes and a strong family history of diabetes.  Patient would like to have her sugars checked.    Elevated cholesterol  Patient with previous labs showing elevated LDL.  We will recheck labs to further evaluate.    Patient has a Nexplanon in place.  Patient follows with [facility], Dr. Parks.  Patient that she is being evaluated for endometriosis, laparoscopy to further evaluate.    Obesity  Patient's BMI elevated at 40.  Will screen for labs.  Discussed diet and exercise.  May need to consider weight loss medication.    ROS:  Gen: no fevers/chills  Pulm: no sob, no cough  CV: no chest pain, no palpitations  GI: no nausea/vomiting, no diarrhea      Objective:     Exam:  /72 (BP Location: Right arm, Patient Position: Sitting, BP Cuff Size: Adult)   Pulse 79   Temp 36.2 °C (97.2 °F) (Temporal)   Resp 16   Ht 1.702 m (5' 7\")   Wt 117 kg (258 lb)   SpO2 96%   BMI 40.41 kg/m²  Body mass index is 40.41 kg/m².    Gen: Alert and oriented, No apparent distress.  Neck: Neck is supple without lymphadenopathy.  Lungs: Normal effort, CTA bilaterally, no wheezes, rhonchi, or rales  CV: Regular rate and rhythm. No murmurs, rubs, or gallops.  Ext: No clubbing, cyanosis, edema.    Assessment & Plan:     22 y.o. female with the following -     1. IFG (impaired fasting glucose)  2. Class 3 severe obesity due to excess calories without serious comorbidity with body mass index (BMI) of 40.0 to 44.9 in adult (HCC)  - HEMOGLOBIN A1C; Future  - Comp Metabolic Panel; Future  - CBC WITHOUT DIFFERENTIAL; Future  - Lipid Profile; Future  - TSH; Future  - FREE THYROXINE; Future    3. Need for vaccination  - Gardasil 9    4. Need for " hepatitis C screening test  - HEP C VIRUS ANTIBODY; Future       No follow-ups on file.    Please note that this dictation was created using voice recognition software. I have made every reasonable attempt to correct obvious errors, but I expect that there are errors of grammar and possibly content that I did not discover before finalizing the note.

## 2023-11-06 ENCOUNTER — TELEMEDICINE (OUTPATIENT)
Dept: MEDICAL GROUP | Facility: MEDICAL CENTER | Age: 23
End: 2023-11-06
Payer: COMMERCIAL

## 2023-11-06 VITALS — HEIGHT: 67 IN | WEIGHT: 265 LBS | BODY MASS INDEX: 41.59 KG/M2

## 2023-11-06 DIAGNOSIS — F32.9 REACTIVE DEPRESSION: ICD-10-CM

## 2023-11-06 PROCEDURE — 99213 OFFICE O/P EST LOW 20 MIN: CPT | Mod: 95 | Performed by: STUDENT IN AN ORGANIZED HEALTH CARE EDUCATION/TRAINING PROGRAM

## 2023-11-06 RX ORDER — FLUOXETINE HYDROCHLORIDE 20 MG/1
20 CAPSULE ORAL DAILY
Qty: 30 CAPSULE | Refills: 11 | Status: SHIPPED | OUTPATIENT
Start: 2023-11-06

## 2023-11-06 ASSESSMENT — FIBROSIS 4 INDEX: FIB4 SCORE: 0.28

## 2023-11-06 NOTE — PROGRESS NOTES
"Virtual Visit: Established Patient   This visit was conducted via Zoom using secure and encrypted videoconferencing technology.   The patient was in their home in the [facility].    The patient's identity was confirmed and verbal consent was obtained for this virtual visit.     Subjective:   CC:   Chief Complaint   Patient presents with    Referral Needed     Therapy: Family issue        Salena Anderson is a 23 y.o. female presenting for evaluation and management of:    Depression  Patient presents today requesting to see a therapist.  On further evaluation patient notes several symptoms of depression.  Patient notes that she has been having relationship issues which has led her to feel very sad.  Patient notes that she has difficulty getting out of bed, does not want to do anything, feels bad about herself, is easily irritable or tearful.  Patient notes that she has been under a lot of stress and is interested in talking with a therapist.  Divorce      ROS   See HPI     Objective:   Ht 1.702 m (5' 7\")   Wt 120 kg (265 lb)   Breastfeeding No   BMI 41.50 kg/m²     Physical Exam:  Constitutional: Alert, no distress, well-groomed.  Skin: No rashes in visible areas.  Eye: Round. Conjunctiva clear, lids normal. No icterus.   ENMT: Lips pink without lesions, good dentition, moist mucous membranes. Phonation normal.  Neck: No masses, no thyromegaly. Moves freely without pain.  Respiratory: Unlabored respiratory effort, no cough or audible wheeze  Psych: Alert and oriented x3, normal affect and mood.     Assessment and Plan:   The following treatment plan was discussed:     1. Reactive depression  Discussed in depth with patient the pro's and con's of antidepressant therapy. I explained that it may take 2-4 wekks for the medication to take effect. Side effects discussed. Some people can have increased depression on these medications. If you should develope any homicidal or suicidal thoughts, you need to go to the " emergency room immediatly for evaluation and possible admission. Otherwise I would like to see you back in two weeks to evaluate treatment success.   - FLUoxetine (PROZAC) 20 MG Cap; Take 1 Capsule by mouth every day.  Dispense: 30 Capsule; Refill: 11     Follow-up: No follow-ups on file.

## 2023-12-29 ENCOUNTER — DOCUMENTATION (OUTPATIENT)
Dept: HEALTH INFORMATION MANAGEMENT | Facility: OTHER | Age: 23
End: 2023-12-29
Payer: COMMERCIAL

## 2024-02-02 ENCOUNTER — TELEPHONE (OUTPATIENT)
Dept: HEALTH INFORMATION MANAGEMENT | Facility: OTHER | Age: 24
End: 2024-02-02
Payer: COMMERCIAL

## 2024-04-26 ENCOUNTER — OFFICE VISIT (OUTPATIENT)
Dept: URGENT CARE | Facility: CLINIC | Age: 24
End: 2024-04-26
Payer: COMMERCIAL

## 2024-04-26 VITALS
SYSTOLIC BLOOD PRESSURE: 128 MMHG | WEIGHT: 250 LBS | DIASTOLIC BLOOD PRESSURE: 86 MMHG | RESPIRATION RATE: 16 BRPM | HEIGHT: 67 IN | BODY MASS INDEX: 39.24 KG/M2 | OXYGEN SATURATION: 97 % | HEART RATE: 95 BPM | TEMPERATURE: 98.1 F

## 2024-04-26 DIAGNOSIS — J01.40 ACUTE NON-RECURRENT PANSINUSITIS: ICD-10-CM

## 2024-04-26 DIAGNOSIS — J02.9 SORE THROAT: ICD-10-CM

## 2024-04-26 LAB — S PYO DNA SPEC NAA+PROBE: NOT DETECTED

## 2024-04-26 PROCEDURE — 87651 STREP A DNA AMP PROBE: CPT | Performed by: PHYSICIAN ASSISTANT

## 2024-04-26 PROCEDURE — 3074F SYST BP LT 130 MM HG: CPT | Performed by: PHYSICIAN ASSISTANT

## 2024-04-26 PROCEDURE — 3079F DIAST BP 80-89 MM HG: CPT | Performed by: PHYSICIAN ASSISTANT

## 2024-04-26 PROCEDURE — 99213 OFFICE O/P EST LOW 20 MIN: CPT | Performed by: PHYSICIAN ASSISTANT

## 2024-04-26 RX ORDER — LIDOCAINE HYDROCHLORIDE 20 MG/ML
SOLUTION OROPHARYNGEAL
Qty: 100 ML | Refills: 0 | Status: SHIPPED | OUTPATIENT
Start: 2024-04-26

## 2024-04-26 RX ORDER — AMOXICILLIN AND CLAVULANATE POTASSIUM 875; 125 MG/1; MG/1
1 TABLET, FILM COATED ORAL 2 TIMES DAILY
Qty: 14 TABLET | Refills: 0 | Status: SHIPPED | OUTPATIENT
Start: 2024-04-26 | End: 2024-05-03

## 2024-04-26 ASSESSMENT — ENCOUNTER SYMPTOMS
CHILLS: 1
HEADACHES: 1
COUGH: 1
SORE THROAT: 1
SPUTUM PRODUCTION: 0
SHORTNESS OF BREATH: 0
WHEEZING: 0
FEVER: 0
PALPITATIONS: 0
HEMOPTYSIS: 0

## 2024-04-26 ASSESSMENT — FIBROSIS 4 INDEX: FIB4 SCORE: 0.28

## 2024-04-27 NOTE — PROGRESS NOTES
Subjective:   Salena Anderson is a 23 y.o. female who presents today with   Chief Complaint   Patient presents with    Sore Throat     X1 week, headache chest hurts from coughing and chills      Pharyngitis   This is a new problem. The current episode started in the past 7 days. The problem has been unchanged. There has been no fever. The pain is mild. Associated symptoms include coughing and headaches. Pertinent negatives include no shortness of breath. Treatments tried: benadryl, saline nasal spray, saltwater gargles. The treatment provided mild relief.     Patient getting lots of congestion from her nose and some blood mixed in with the congestion as well.  Not coughing up any congestion.    PMH:  has a past medical history of Ingrown nail and Strep throat.    She has no past medical history of Allergy, ASTHMA, or Diabetes.  MEDS:   Current Outpatient Medications:     lidocaine (XYLOCAINE) 2 % Solution, Gargle and spit 5 mL every 6 hours as needed for sore throat., Disp: 100 mL, Rfl: 0    amoxicillin-clavulanate (AUGMENTIN) 875-125 MG Tab, Take 1 Tablet by mouth 2 times a day for 7 days., Disp: 14 Tablet, Rfl: 0    FLUoxetine (PROZAC) 20 MG Cap, Take 1 Capsule by mouth every day., Disp: 30 Capsule, Rfl: 11    etonogestrel (NEXPLANON) 68 MG Implant implant, Inject 1 Each under the skin one time., Disp: , Rfl:   ALLERGIES: No Known Allergies  SURGHX:   Past Surgical History:   Procedure Laterality Date    APPENDECTOMY       SOCHX:  reports that she has never smoked. She has never used smokeless tobacco. She reports that she does not drink alcohol and does not use drugs.  FH: Reviewed with patient, not pertinent to this visit.     Review of Systems   Constitutional:  Positive for chills. Negative for fever.   HENT:  Positive for sore throat.    Respiratory:  Positive for cough. Negative for hemoptysis, sputum production, shortness of breath and wheezing.    Cardiovascular:  Positive for chest pain (only with  "cough). Negative for palpitations.   Neurological:  Positive for headaches.      Objective:   /86   Pulse 95   Temp 36.7 °C (98.1 °F) (Temporal)   Resp 16   Ht 1.702 m (5' 7\")   Wt 113 kg (250 lb)   SpO2 97%   BMI 39.16 kg/m²   Physical Exam  Vitals and nursing note reviewed.   Constitutional:       General: She is not in acute distress.     Appearance: Normal appearance. She is well-developed. She is not ill-appearing or toxic-appearing.   HENT:      Head: Normocephalic and atraumatic.      Right Ear: Hearing, tympanic membrane and ear canal normal.      Left Ear: Hearing, tympanic membrane and ear canal normal.      Nose: Mucosal edema and congestion present.      Right Nostril: No epistaxis.      Left Nostril: No epistaxis.      Mouth/Throat:      Pharynx: Uvula midline. Posterior oropharyngeal erythema present. No uvula swelling.      Tonsils: No tonsillar exudate or tonsillar abscesses. 1+ on the right. 1+ on the left.   Cardiovascular:      Rate and Rhythm: Normal rate and regular rhythm.      Heart sounds: Normal heart sounds.   Pulmonary:      Effort: Pulmonary effort is normal. No respiratory distress.      Breath sounds: Normal breath sounds. No stridor. No wheezing, rhonchi or rales.   Musculoskeletal:      Comments: Normal movement in all 4 extremities   Skin:     General: Skin is warm and dry.   Neurological:      Mental Status: She is alert.      Coordination: Coordination normal.   Psychiatric:         Mood and Affect: Mood normal.       STREP A -    Assessment/Plan:   Assessment   1. Acute non-recurrent pansinusitis  - amoxicillin-clavulanate (AUGMENTIN) 875-125 MG Tab; Take 1 Tablet by mouth 2 times a day for 7 days.  Dispense: 14 Tablet; Refill: 0    2. Sore throat  - POCT GROUP A STREP, PCR  - lidocaine (XYLOCAINE) 2 % Solution; Gargle and spit 5 mL every 6 hours as needed for sore throat.  Dispense: 100 mL; Refill: 0    Symptoms and presentation today appear to be consistent with " sinus infection and we will treat accordingly with antibiotics.  Would recommend patient use over-the-counter sinus rinse to help alleviate symptoms.  Would suggest trialing over-the-counter Excedrin per 's instructions to help with sinus headache.    Differential diagnosis, natural history, supportive care, and indications for immediate follow-up discussed.   Patient given instructions and understanding of medications and treatment.    If not improving in 3-5 days, F/U with PCP or return to  if symptoms worsen.    Patient agreeable to plan.      Please note that this dictation was created using voice recognition software. I have made every reasonable attempt to correct obvious errors, but I expect that there are errors of grammar and possibly content that I did not discover before finalizing the note.    Steven Ibrahim PA-C

## 2024-04-28 ENCOUNTER — OFFICE VISIT (OUTPATIENT)
Dept: URGENT CARE | Facility: CLINIC | Age: 24
End: 2024-04-28
Payer: COMMERCIAL

## 2024-04-28 VITALS
HEART RATE: 93 BPM | DIASTOLIC BLOOD PRESSURE: 78 MMHG | TEMPERATURE: 97.7 F | BODY MASS INDEX: 39.24 KG/M2 | HEIGHT: 67 IN | OXYGEN SATURATION: 96 % | SYSTOLIC BLOOD PRESSURE: 116 MMHG | RESPIRATION RATE: 16 BRPM | WEIGHT: 250 LBS

## 2024-04-28 DIAGNOSIS — H10.33 ACUTE CONJUNCTIVITIS OF BOTH EYES, UNSPECIFIED ACUTE CONJUNCTIVITIS TYPE: ICD-10-CM

## 2024-04-28 RX ORDER — POLYMYXIN B SULFATE AND TRIMETHOPRIM 1; 10000 MG/ML; [USP'U]/ML
1 SOLUTION OPHTHALMIC EVERY 4 HOURS
Qty: 10 ML | Refills: 0 | Status: SHIPPED | OUTPATIENT
Start: 2024-04-28 | End: 2024-05-03

## 2024-04-28 ASSESSMENT — ENCOUNTER SYMPTOMS
EYE REDNESS: 1
EYE DISCHARGE: 1

## 2024-04-28 ASSESSMENT — FIBROSIS 4 INDEX: FIB4 SCORE: 0.28

## 2024-04-28 NOTE — PROGRESS NOTES
"Subjective     Salena Anderson is a 23 y.o. female who presents with Conjunctivitis (Started this morning, woke up and eyes were shut with mucus, puffiness both eyes, redness)            Conjunctivitis  This is a new problem. Episode onset: pt reports new onset of bilateral eye redness and drainage that started this morning. no itching. no FB sensation. +recent URI. Treatments tried: cleaned here eyes at home. The treatment provided no relief.       Review of Systems   Eyes:  Positive for discharge and redness.   All other systems reviewed and are negative.         Past Medical History:   Diagnosis Date    Ingrown nail     Strep throat       Past Surgical History:   Procedure Laterality Date    APPENDECTOMY        Social History     Socioeconomic History    Marital status: Single     Spouse name: Not on file    Number of children: Not on file    Years of education: Not on file    Highest education level: Not on file   Occupational History    Not on file   Tobacco Use    Smoking status: Never    Smokeless tobacco: Never   Vaping Use    Vaping Use: Never used   Substance and Sexual Activity    Alcohol use: No     Comment: 2x/month    Drug use: No    Sexual activity: Not Currently     Partners: Male     Birth control/protection: Implant   Other Topics Concern    Not on file   Social History Narrative    Not on file     Social Determinants of Health     Financial Resource Strain: Not on file   Food Insecurity: Not on file   Transportation Needs: Not on file   Physical Activity: Not on file   Stress: Not on file   Social Connections: Not on file   Intimate Partner Violence: Not on file   Housing Stability: Not on file         Objective     /78 (BP Location: Left arm, Patient Position: Sitting, BP Cuff Size: Large adult)   Pulse 93   Temp 36.5 °C (97.7 °F)   Resp 16   Ht 1.702 m (5' 7\")   Wt 113 kg (250 lb)   SpO2 96%   BMI 39.16 kg/m²      Physical Exam  Vitals and nursing note reviewed.   Constitutional:     "   Appearance: Normal appearance. She is normal weight.   HENT:      Head: Normocephalic and atraumatic.      Nose: Nose normal.      Mouth/Throat:      Mouth: Mucous membranes are moist.      Pharynx: Oropharynx is clear.   Eyes:      General:         Right eye: No discharge.         Left eye: No discharge.      Extraocular Movements: Extraocular movements intact.      Conjunctiva/sclera:      Right eye: Right conjunctiva is injected.      Left eye: Left conjunctiva is injected.      Pupils: Pupils are equal, round, and reactive to light.   Cardiovascular:      Rate and Rhythm: Normal rate and regular rhythm.   Pulmonary:      Effort: Pulmonary effort is normal.   Musculoskeletal:         General: Normal range of motion.      Cervical back: Normal range of motion.   Skin:     General: Skin is warm and dry.      Capillary Refill: Capillary refill takes less than 2 seconds.   Neurological:      General: No focal deficit present.      Mental Status: She is alert and oriented to person, place, and time. Mental status is at baseline.   Psychiatric:         Mood and Affect: Mood normal.         Speech: Speech normal.         Thought Content: Thought content normal.         Judgment: Judgment normal.                             Assessment & Plan        1. Acute conjunctivitis of both eyes, unspecified acute conjunctivitis type  - polymixin-trimethoprim (POLYTRIM) 46466-3.1 UNIT/ML-% Solution; Administer 1 Drop into both eyes every 4 hours for 5 days.  Dispense: 10 mL; Refill: 0     Discussed viral vs allergy vs bacterial etiology  Encouraged use of daily claritin  Warm compresses to affected eye(s) 3 times daily  Hand hygiene discussed  Wash all recently used linens and towels in hot water  Do not touch dropper to eye (s)  Supportive care, differential diagnoses, and indications for immediate follow-up discussed with patient.    Pathogenesis of diagnosis discussed including typical length and natural progression.     Instructed to return to  or nearest emergency department if symptoms fail to improve, for any change in condition, further concerns, or new concerning symptoms.  Patient states understanding of the plan of care and discharge instructions.

## 2024-05-05 NOTE — PROGRESS NOTES
"Subjective     Salena Anderson is a 22 y.o. female who presents with Cough (X 1 month Congestion , mom tested positive for flu . Requesting to be tested )            Salena is a 22 y.o. with symptoms of cough and nasal congestion.  Patient states that she developed symptoms of fever/chills, body aches, cough, nasal congestion Wednesday with worsening of symptoms Thursday through Friday. Patient states symptoms have gradually improved since Friday.  Patient states that she was recently in urgent care and was diagnosed and treated for a sinus infection.  Patient states she took 2 days of antibiotics and felt better so discontinued the antibiotics.  When patient started feeling sick again on Wednesday, she tried taking the antibiotics initially as prescribed that she did not complete initially.  Patient states her mom recently tested positive for flu and is concerned that she also has the flu.  Patient also concerned of 1-year-old daughter at home as she appears to be getting sick as well.  Patient requesting testing in clinic today.      Review of Systems   Constitutional:  Positive for chills, fever and malaise/fatigue.   HENT:  Positive for congestion. Negative for ear pain and sore throat.    Respiratory:  Positive for cough. Negative for shortness of breath and wheezing.    Cardiovascular:  Negative for palpitations.   Gastrointestinal:  Negative for abdominal pain, constipation, diarrhea, nausea and vomiting.   Musculoskeletal:  Positive for myalgias.   All other systems reviewed and are negative.           Objective     /78 (BP Location: Right arm, Patient Position: Sitting, BP Cuff Size: Adult)   Pulse 82   Temp 36.2 °C (97.2 °F) (Temporal)   Resp 16   Ht 1.702 m (5' 7\")   Wt 113 kg (249 lb)   SpO2 99%   BMI 39.00 kg/m²      Physical Exam  Vitals reviewed.   Constitutional:       General: She is not in acute distress.     Appearance: Normal appearance. She is ill-appearing. She is not " toxic-appearing.   HENT:      Head: Normocephalic and atraumatic.      Right Ear: Tympanic membrane, ear canal and external ear normal.      Left Ear: Tympanic membrane, ear canal and external ear normal.      Nose: Nose normal.      Mouth/Throat:      Mouth: Mucous membranes are moist.      Pharynx: Oropharynx is clear.   Eyes:      Extraocular Movements: Extraocular movements intact.      Conjunctiva/sclera: Conjunctivae normal.      Pupils: Pupils are equal, round, and reactive to light.   Cardiovascular:      Rate and Rhythm: Normal rate and regular rhythm.   Pulmonary:      Effort: Pulmonary effort is normal.      Breath sounds: Normal breath sounds.   Skin:     General: Skin is warm and dry.   Neurological:      General: No focal deficit present.      Mental Status: She is alert. Mental status is at baseline.                           Assessment & Plan        1. Viral URI with cough  - CoV-2, Flu A/B, And RSV by PCR (Cepheid); Future  - benzonatate (TESSALON) 100 MG Cap; Take 1 Capsule by mouth 3 times a day as needed for Cough.  Dispense: 60 Capsule; Refill: 0     Patient states that she was recently in urgent care and was diagnosed and treated for a sinus infection.  Patient states she took 2 days of antibiotics and felt better so discontinued the antibiotics.  When patient started feeling sick again on Wednesday, she tried taking the antibiotics initially as prescribed that she did not complete initially.  Patient to complete full course of antibiotics as prescribed previously, though it does appear the patient currently has new viral illness. CoV-2, Flu A/B, And RSV by PCR collected.  Results will be available via [employer].    Differential diagnoses, supportive care measures (rest, hydration, OTC medications, nasal saline sprays, humidified air), and indications for immediate follow-up discussed with patient. Pathogenesis of diagnosis discussed including typical length and natural progression.    Instructed  to return to urgent care or nearest emergency department if symptoms fail to improve, for any change in condition, further concerns, or new concerning symptoms.    Patient states understanding and agrees with the plan of care and discharge instructions.                  Previously Declined (within the last year)

## 2024-08-19 ENCOUNTER — HOSPITAL ENCOUNTER (OUTPATIENT)
Facility: MEDICAL CENTER | Age: 24
End: 2024-08-19
Attending: OBSTETRICS & GYNECOLOGY | Admitting: OBSTETRICS & GYNECOLOGY
Payer: COMMERCIAL

## 2025-01-01 ENCOUNTER — HOSPITAL ENCOUNTER (EMERGENCY)
Facility: MEDICAL CENTER | Age: 25
End: 2025-01-01
Attending: EMERGENCY MEDICINE
Payer: COMMERCIAL

## 2025-01-01 VITALS
OXYGEN SATURATION: 97 % | HEIGHT: 67 IN | RESPIRATION RATE: 16 BRPM | SYSTOLIC BLOOD PRESSURE: 137 MMHG | WEIGHT: 257.06 LBS | TEMPERATURE: 98.4 F | HEART RATE: 71 BPM | DIASTOLIC BLOOD PRESSURE: 78 MMHG | BODY MASS INDEX: 40.35 KG/M2

## 2025-01-01 DIAGNOSIS — O20.0 THREATENED MISCARRIAGE IN EARLY PREGNANCY: ICD-10-CM

## 2025-01-01 LAB
ALBUMIN SERPL BCP-MCNC: 4.3 G/DL (ref 3.2–4.9)
ALBUMIN/GLOB SERPL: 1.4 G/DL
ALP SERPL-CCNC: 58 U/L (ref 30–99)
ALT SERPL-CCNC: 34 U/L (ref 2–50)
ANION GAP SERPL CALC-SCNC: 12 MMOL/L (ref 7–16)
AST SERPL-CCNC: 29 U/L (ref 12–45)
B-HCG SERPL-ACNC: 15.2 MIU/ML (ref 0–5)
BASOPHILS # BLD AUTO: 0.7 % (ref 0–1.8)
BASOPHILS # BLD: 0.06 K/UL (ref 0–0.12)
BILIRUB SERPL-MCNC: 0.7 MG/DL (ref 0.1–1.5)
BUN SERPL-MCNC: 7 MG/DL (ref 8–22)
CALCIUM ALBUM COR SERPL-MCNC: 9.2 MG/DL (ref 8.5–10.5)
CALCIUM SERPL-MCNC: 9.4 MG/DL (ref 8.5–10.5)
CHLORIDE SERPL-SCNC: 105 MMOL/L (ref 96–112)
CO2 SERPL-SCNC: 23 MMOL/L (ref 20–33)
CREAT SERPL-MCNC: 0.6 MG/DL (ref 0.5–1.4)
EOSINOPHIL # BLD AUTO: 0.12 K/UL (ref 0–0.51)
EOSINOPHIL NFR BLD: 1.5 % (ref 0–6.9)
ERYTHROCYTE [DISTWIDTH] IN BLOOD BY AUTOMATED COUNT: 37 FL (ref 35.9–50)
GFR SERPLBLD CREATININE-BSD FMLA CKD-EPI: 127 ML/MIN/1.73 M 2
GLOBULIN SER CALC-MCNC: 3 G/DL (ref 1.9–3.5)
GLUCOSE SERPL-MCNC: 94 MG/DL (ref 65–99)
HCT VFR BLD AUTO: 41.3 % (ref 37–47)
HGB BLD-MCNC: 14 G/DL (ref 12–16)
IMM GRANULOCYTES # BLD AUTO: 0.02 K/UL (ref 0–0.11)
IMM GRANULOCYTES NFR BLD AUTO: 0.2 % (ref 0–0.9)
LIPASE SERPL-CCNC: 32 U/L (ref 11–82)
LYMPHOCYTES # BLD AUTO: 1.84 K/UL (ref 1–4.8)
LYMPHOCYTES NFR BLD: 23 % (ref 22–41)
MCH RBC QN AUTO: 28.7 PG (ref 27–33)
MCHC RBC AUTO-ENTMCNC: 33.9 G/DL (ref 32.2–35.5)
MCV RBC AUTO: 84.8 FL (ref 81.4–97.8)
MONOCYTES # BLD AUTO: 0.73 K/UL (ref 0–0.85)
MONOCYTES NFR BLD AUTO: 9.1 % (ref 0–13.4)
NEUTROPHILS # BLD AUTO: 5.24 K/UL (ref 1.82–7.42)
NEUTROPHILS NFR BLD: 65.5 % (ref 44–72)
NRBC # BLD AUTO: 0 K/UL
NRBC BLD-RTO: 0 /100 WBC (ref 0–0.2)
NUMBER OF RH DOSES IND 8505RD: NORMAL
PLATELET # BLD AUTO: 338 K/UL (ref 164–446)
PMV BLD AUTO: 10.5 FL (ref 9–12.9)
POTASSIUM SERPL-SCNC: 3.7 MMOL/L (ref 3.6–5.5)
PROT SERPL-MCNC: 7.3 G/DL (ref 6–8.2)
RBC # BLD AUTO: 4.87 M/UL (ref 4.2–5.4)
RH BLD: NORMAL
SODIUM SERPL-SCNC: 140 MMOL/L (ref 135–145)
WBC # BLD AUTO: 8 K/UL (ref 4.8–10.8)

## 2025-01-01 PROCEDURE — 99284 EMERGENCY DEPT VISIT MOD MDM: CPT

## 2025-01-01 PROCEDURE — 83690 ASSAY OF LIPASE: CPT

## 2025-01-01 PROCEDURE — 84702 CHORIONIC GONADOTROPIN TEST: CPT

## 2025-01-01 PROCEDURE — 86901 BLOOD TYPING SEROLOGIC RH(D): CPT

## 2025-01-01 PROCEDURE — 36415 COLL VENOUS BLD VENIPUNCTURE: CPT

## 2025-01-01 PROCEDURE — 80053 COMPREHEN METABOLIC PANEL: CPT

## 2025-01-01 PROCEDURE — 85025 COMPLETE CBC W/AUTO DIFF WBC: CPT

## 2025-01-01 ASSESSMENT — FIBROSIS 4 INDEX: FIB4 SCORE: 0.29

## 2025-01-01 NOTE — ED TRIAGE NOTES
Chief Complaint   Patient presents with    Vaginal Bleeding     X2 1 day. Pt reports light bleeding yesterday but has increased today. States she has not gone through any pads today. +cramping and back pain.     Pregnancy     5 weeks 3 days, not confirmed with US. .       Patient ambulatory to triage for above complaint. Patient A&Ox4, GCS 15, patient speaking in full sentences. Equal and unlabored respirations. Patient educated on triage process and encouraged to notify staff if condition worsens. Appropriate protocols ordered. Patient returned to the lobby in stable condition.

## 2025-01-02 NOTE — ED PROVIDER NOTES
ER Provider Note    Scribed for Kerry Lebron M.d. by Rosalio Flood. 2025  4:00 PM    Primary Care Provider: Bettina Taylor P.A.-C.    CHIEF COMPLAINT   Chief Complaint   Patient presents with    Vaginal Bleeding     X2 1 day. Pt reports light bleeding yesterday but has increased today. States she has not gone through any pads today. +cramping and back pain.     Pregnancy     5 weeks 3 days, not confirmed with US. .      EXTERNAL RECORDS REVIEWED  Outpatient Notes patient has seen Dr. Parks GYN in the past    HPI/ROS  LIMITATION TO HISTORY   Select: : None  OUTSIDE HISTORIAN(S):  Family was present at bedside.    Salena Anderson is a 24 y.o. female who presents to the ED for evaluation of vaginal bleeding. The patient is  and reports to be currently 5 weeks pregnant after having multiple positive pregnancy test. Her LMP was on 24 and has not had an Ultrasound yet. Today the patient arrives with abdominal cramping, she notes to have light vaginal bleeding that has increased today. Concerns of symptoms the patient arrives to the ED for further evaluation. The patient does follow up with OBGYN. No additional pain or symptoms noted at this time.     PAST MEDICAL HISTORY  Past Medical History:   Diagnosis Date    Ingrown nail     Strep throat        SURGICAL HISTORY  Past Surgical History:   Procedure Laterality Date    APPENDECTOMY         FAMILY HISTORY  History reviewed. No pertinent family history.    SOCIAL HISTORY   reports that she has never smoked. She has never used smokeless tobacco. She reports that she does not drink alcohol and does not use drugs.    CURRENT MEDICATIONS  Discharge Medication List as of 2025  4:32 PM        CONTINUE these medications which have NOT CHANGED    Details   lidocaine (XYLOCAINE) 2 % Solution Gargle and spit 5 mL every 6 hours as needed for sore throat., Disp-100 mL, R-0, Normal      FLUoxetine (PROZAC) 20 MG Cap Take 1 Capsule by mouth every day., Disp-30  "Capsule, R-11, Normal      etonogestrel (NEXPLANON) 68 MG Implant implant Inject 1 Each under the skin one time., Historical Med             ALLERGIES  Patient has no known allergies.    PHYSICAL EXAM  /79   Pulse 75   Temp 36.4 °C (97.5 °F) (Temporal)   Resp 16   Ht 1.702 m (5' 7\")   Wt 117 kg (257 lb 0.9 oz)   SpO2 98%   BMI 40.26 kg/m²   Constitutional: Alert in no apparent distress.  HENT: No signs of trauma, Bilateral external ears normal, Nose normal.   Eyes: Pupils are equal and reactive, Conjunctiva normal, Non-icteric.   Neck:  No stridor.   Cardiovascular: Regular rate and rhythm, no murmurs.   Thorax & Lungs: Normal breath sounds, No respiratory distress, No wheezing, No chest tenderness.   Abdomen: Bowel sounds normal, Soft, No tenderness, No masses, No peritoneal signs.  Skin: Warm, Dry, No erythema, No rash.   Musculoskeletal:  No major deformities noted.   Neurologic: Alert, moving all extremities without difficulty, no focal deficits.     DIAGNOSTIC STUDIES    EKG/LABS  Results for orders placed or performed during the hospital encounter of 01/01/25   CBC WITH DIFFERENTIAL    Collection Time: 01/01/25  2:30 PM   Result Value Ref Range    WBC 8.0 4.8 - 10.8 K/uL    RBC 4.87 4.20 - 5.40 M/uL    Hemoglobin 14.0 12.0 - 16.0 g/dL    Hematocrit 41.3 37.0 - 47.0 %    MCV 84.8 81.4 - 97.8 fL    MCH 28.7 27.0 - 33.0 pg    MCHC 33.9 32.2 - 35.5 g/dL    RDW 37.0 35.9 - 50.0 fL    Platelet Count 338 164 - 446 K/uL    MPV 10.5 9.0 - 12.9 fL    Neutrophils-Polys 65.50 44.00 - 72.00 %    Lymphocytes 23.00 22.00 - 41.00 %    Monocytes 9.10 0.00 - 13.40 %    Eosinophils 1.50 0.00 - 6.90 %    Basophils 0.70 0.00 - 1.80 %    Immature Granulocytes 0.20 0.00 - 0.90 %    Nucleated RBC 0.00 0.00 - 0.20 /100 WBC    Neutrophils (Absolute) 5.24 1.82 - 7.42 K/uL    Lymphs (Absolute) 1.84 1.00 - 4.80 K/uL    Monos (Absolute) 0.73 0.00 - 0.85 K/uL    Eos (Absolute) 0.12 0.00 - 0.51 K/uL    Baso (Absolute) 0.06 0.00 " - 0.12 K/uL    Immature Granulocytes (abs) 0.02 0.00 - 0.11 K/uL    NRBC (Absolute) 0.00 K/uL   COMP METABOLIC PANEL    Collection Time: 01/01/25  2:30 PM   Result Value Ref Range    Sodium 140 135 - 145 mmol/L    Potassium 3.7 3.6 - 5.5 mmol/L    Chloride 105 96 - 112 mmol/L    Co2 23 20 - 33 mmol/L    Anion Gap 12.0 7.0 - 16.0    Glucose 94 65 - 99 mg/dL    Bun 7 (L) 8 - 22 mg/dL    Creatinine 0.60 0.50 - 1.40 mg/dL    Calcium 9.4 8.5 - 10.5 mg/dL    Correct Calcium 9.2 8.5 - 10.5 mg/dL    AST(SGOT) 29 12 - 45 U/L    ALT(SGPT) 34 2 - 50 U/L    Alkaline Phosphatase 58 30 - 99 U/L    Total Bilirubin 0.7 0.1 - 1.5 mg/dL    Albumin 4.3 3.2 - 4.9 g/dL    Total Protein 7.3 6.0 - 8.2 g/dL    Globulin 3.0 1.9 - 3.5 g/dL    A-G Ratio 1.4 g/dL   LIPASE    Collection Time: 01/01/25  2:30 PM   Result Value Ref Range    Lipase 32 11 - 82 U/L   HCG QUANTITATIVE    Collection Time: 01/01/25  2:30 PM   Result Value Ref Range    Bhcg 15.2 (H) 0.0 - 5.0 mIU/mL   RH Type for Rhogam from E.D.    Collection Time: 01/01/25  2:30 PM   Result Value Ref Range    Emergency Department Rh Typing POS     Number Of Rh Doses Indicated ZERO    ESTIMATED GFR    Collection Time: 01/01/25  2:30 PM   Result Value Ref Range    GFR (CKD-EPI) 127 >60 mL/min/1.73 m 2            COURSE & MEDICAL DECISION MAKING     ASSESSMENT, COURSE AND PLAN  Care Narrative: 4:03 PM - Patient seen and examined at bedside. Discussed plan of care, including following up with OBGYN for repeated HCG levels. Patient agrees to the plan of care. The patient had the opportunity to ask questions at this time. The patient will be discharged and should return if symptoms worsen or if new symptoms arise. The patient understands and agrees to plan.      Is a 24-year-old female that presents with vaginal bleeding in pregnancy.  Patient has taken multiple pregnancy tests over the last week that have been positive.  Labs here show a quant of 15.  She is Rh+.  Patient is not in severe  pain.  She is having light bleeding per her history.  Given her low quant with minimal pain I do not think an ultrasound will be helpful.  I would expect if she has concerns for an ectopic she would have an increased amount of pain and/or bleeding.  She has great follow-up with Dr. Redd, [facility] and did a quant yesterday at their office and has another 1 scheduled for tomorrow.  I do think she can follow-up with him as an outpatient.  She does not need RhoGAM.  Patient is agreeable to this plan.        DISPOSITION AND DISCUSSIONS  I have discussed management of the patient with the following physicians and MICHAEL's:  None    Discussion of management with other QHP or appropriate source(s): None     Escalation of care considered, and ultimately not performed: IV fluids, Laboratory analysis, and diagnostic imaging.    Barriers to care at this time, including but not limited to:  None .     Decision tools and prescription drugs considered including, but not limited to: None.    The patient will return for new or worsening symptoms and is stable at the time of discharge.    DISPOSITION:  Patient will be discharged home in stable condition.    FOLLOW UP:  Dakota Redd M.D.  [address]   [address] [address] [phone]      follow up for further HCG testing    [facility], Emergency Dept  [address] [facility] [address] [phone]    Return for increasing abdominal pain, bleeding greater than 2 pads per hour or other concerns      FINAL DIANGOSIS  1. Threatened miscarriage in early pregnancy      Rosalio GALLARDO), am scribing for, and in the presence of, Kerry Lebron M.D..    Electronically signed by: Rosalio Dowling), 1/1/2025    Kerry GALLARDO M.D. personally performed the services described in this documentation, as scribed by Rosalio Flood in my presence, and it is both accurate and complete.      The note accurately reflects work and decisions  made by me.  Kerry Lebron M.D.  1/1/2025  6:26 PM

## 2025-01-02 NOTE — ED NOTES
Pt provided discharge instructions and follow-up information. Pt verbalized understanding and all questions answered.  Pt ambulated from ED with steady gait carrying all belongings.

## 2025-07-16 ENCOUNTER — APPOINTMENT (OUTPATIENT)
Dept: RADIOLOGY | Facility: MEDICAL CENTER | Age: 25
End: 2025-07-16
Attending: EMERGENCY MEDICINE
Payer: COMMERCIAL

## 2025-07-16 ENCOUNTER — HOSPITAL ENCOUNTER (EMERGENCY)
Facility: MEDICAL CENTER | Age: 25
End: 2025-07-16
Attending: EMERGENCY MEDICINE
Payer: COMMERCIAL

## 2025-07-16 VITALS
TEMPERATURE: 98 F | HEIGHT: 67 IN | DIASTOLIC BLOOD PRESSURE: 66 MMHG | SYSTOLIC BLOOD PRESSURE: 152 MMHG | RESPIRATION RATE: 17 BRPM | HEART RATE: 95 BPM | OXYGEN SATURATION: 97 % | WEIGHT: 266.1 LBS | BODY MASS INDEX: 41.76 KG/M2

## 2025-07-16 DIAGNOSIS — O26.891 ABDOMINAL PAIN DURING PREGNANCY IN FIRST TRIMESTER: Primary | ICD-10-CM

## 2025-07-16 DIAGNOSIS — R73.9 HYPERGLYCEMIA: ICD-10-CM

## 2025-07-16 DIAGNOSIS — O20.0 THREATENED MISCARRIAGE: ICD-10-CM

## 2025-07-16 DIAGNOSIS — R10.9 ABDOMINAL PAIN DURING PREGNANCY IN FIRST TRIMESTER: Primary | ICD-10-CM

## 2025-07-16 LAB
ALBUMIN SERPL BCP-MCNC: 4.4 G/DL (ref 3.2–4.9)
ALBUMIN/GLOB SERPL: 1.4 G/DL
ALP SERPL-CCNC: 59 U/L (ref 30–99)
ALT SERPL-CCNC: 26 U/L (ref 2–50)
ANION GAP SERPL CALC-SCNC: 11 MMOL/L (ref 7–16)
APPEARANCE UR: ABNORMAL
AST SERPL-CCNC: 26 U/L (ref 12–45)
B-HCG SERPL-ACNC: ABNORMAL MIU/ML (ref 0–5)
BACTERIA #/AREA URNS HPF: ABNORMAL /HPF
BASOPHILS # BLD AUTO: 0.5 % (ref 0–1.8)
BASOPHILS # BLD: 0.06 K/UL (ref 0–0.12)
BILIRUB SERPL-MCNC: 0.5 MG/DL (ref 0.1–1.5)
BILIRUB UR QL STRIP.AUTO: NEGATIVE
BUN SERPL-MCNC: 9 MG/DL (ref 8–22)
CA OXALATE CRYSTAL  1765: PRESENT /HPF
CALCIUM ALBUM COR SERPL-MCNC: 9.1 MG/DL (ref 8.5–10.5)
CALCIUM SERPL-MCNC: 9.4 MG/DL (ref 8.5–10.5)
CASTS URNS QL MICRO: ABNORMAL /LPF (ref 0–2)
CHLORIDE SERPL-SCNC: 104 MMOL/L (ref 96–112)
CO2 SERPL-SCNC: 21 MMOL/L (ref 20–33)
COLOR UR: YELLOW
CREAT SERPL-MCNC: 0.55 MG/DL (ref 0.5–1.4)
EOSINOPHIL # BLD AUTO: 0.1 K/UL (ref 0–0.51)
EOSINOPHIL NFR BLD: 0.9 % (ref 0–6.9)
EPITHELIAL CELLS 1715: ABNORMAL /HPF (ref 0–5)
ERYTHROCYTE [DISTWIDTH] IN BLOOD BY AUTOMATED COUNT: 38.9 FL (ref 35.9–50)
GFR SERPLBLD CREATININE-BSD FMLA CKD-EPI: 129 ML/MIN/1.73 M 2
GLOBULIN SER CALC-MCNC: 3.1 G/DL (ref 1.9–3.5)
GLUCOSE SERPL-MCNC: 130 MG/DL (ref 65–99)
GLUCOSE UR STRIP.AUTO-MCNC: NEGATIVE MG/DL
HCT VFR BLD AUTO: 42.6 % (ref 37–47)
HGB BLD-MCNC: 14.6 G/DL (ref 12–16)
IMM GRANULOCYTES # BLD AUTO: 0.05 K/UL (ref 0–0.11)
IMM GRANULOCYTES NFR BLD AUTO: 0.4 % (ref 0–0.9)
KETONES UR STRIP.AUTO-MCNC: ABNORMAL MG/DL
LEUKOCYTE ESTERASE UR QL STRIP.AUTO: ABNORMAL
LIPASE SERPL-CCNC: 32 U/L (ref 11–82)
LYMPHOCYTES # BLD AUTO: 1.96 K/UL (ref 1–4.8)
LYMPHOCYTES NFR BLD: 16.7 % (ref 22–41)
MCH RBC QN AUTO: 29.3 PG (ref 27–33)
MCHC RBC AUTO-ENTMCNC: 34.3 G/DL (ref 32.2–35.5)
MCV RBC AUTO: 85.5 FL (ref 81.4–97.8)
MICRO URNS: ABNORMAL
MONOCYTES # BLD AUTO: 0.73 K/UL (ref 0–0.85)
MONOCYTES NFR BLD AUTO: 6.2 % (ref 0–13.4)
NEUTROPHILS # BLD AUTO: 8.86 K/UL (ref 1.82–7.42)
NEUTROPHILS NFR BLD: 75.3 % (ref 44–72)
NITRITE UR QL STRIP.AUTO: NEGATIVE
NRBC # BLD AUTO: 0 K/UL
NRBC BLD-RTO: 0 /100 WBC (ref 0–0.2)
NUMBER OF RH DOSES IND 8505RD: NORMAL
PH UR STRIP.AUTO: 5.5 [PH] (ref 5–8)
PLATELET # BLD AUTO: 288 K/UL (ref 164–446)
PMV BLD AUTO: 10.4 FL (ref 9–12.9)
POTASSIUM SERPL-SCNC: 4.1 MMOL/L (ref 3.6–5.5)
PROT SERPL-MCNC: 7.5 G/DL (ref 6–8.2)
PROT UR QL STRIP: NEGATIVE MG/DL
RBC # BLD AUTO: 4.98 M/UL (ref 4.2–5.4)
RBC # URNS HPF: ABNORMAL /HPF (ref 0–2)
RBC UR QL AUTO: NEGATIVE
RH BLD: NORMAL
SODIUM SERPL-SCNC: 136 MMOL/L (ref 135–145)
SP GR UR STRIP.AUTO: 1.02
UROBILINOGEN UR STRIP.AUTO-MCNC: 0.2 EU/DL
WBC # BLD AUTO: 11.8 K/UL (ref 4.8–10.8)
WBC #/AREA URNS HPF: ABNORMAL /HPF

## 2025-07-16 PROCEDURE — 76817 TRANSVAGINAL US OBSTETRIC: CPT

## 2025-07-16 PROCEDURE — 99284 EMERGENCY DEPT VISIT MOD MDM: CPT

## 2025-07-16 PROCEDURE — 83690 ASSAY OF LIPASE: CPT

## 2025-07-16 PROCEDURE — 85025 COMPLETE CBC W/AUTO DIFF WBC: CPT

## 2025-07-16 PROCEDURE — 80053 COMPREHEN METABOLIC PANEL: CPT

## 2025-07-16 PROCEDURE — 36415 COLL VENOUS BLD VENIPUNCTURE: CPT

## 2025-07-16 PROCEDURE — 86901 BLOOD TYPING SEROLOGIC RH(D): CPT

## 2025-07-16 PROCEDURE — 84702 CHORIONIC GONADOTROPIN TEST: CPT

## 2025-07-16 PROCEDURE — 81001 URINALYSIS AUTO W/SCOPE: CPT

## 2025-07-16 RX ORDER — ONDANSETRON 4 MG/1
4 TABLET, ORALLY DISINTEGRATING ORAL EVERY 6 HOURS PRN
Qty: 10 TABLET | Refills: 0 | Status: SHIPPED | OUTPATIENT
Start: 2025-07-16

## 2025-07-16 ASSESSMENT — FIBROSIS 4 INDEX: FIB4 SCORE: 0.38

## 2025-07-17 NOTE — ED PROVIDER NOTES
"ER Provider Note    Scribed for  Dago Mazariegos D.O. by Sarah Alexander. 2025   9:03 PM    Primary Care Provider: Bettina Taylor P.A.-C.    CHIEF COMPLAINT  Chief Complaint   Patient presents with    Abdominal Pain     LLQ pain denies any vaginal bleeding     Pregnancy     5wks HCG levels were abnormal when tested  5482  [id number]     Sent by MD     Wanted to do an US on Friday but if any discomfort to come to ED before then.     N/V     EXTERNAL RECORDS REVIEWED  Outpatient Notes patient was seen 25 here for threatened miscarriage in early pregnancy.    HPI/ROS  LIMITATION TO HISTORY   Select: : None  OUTSIDE HISTORIAN(S):  Significant other present at bedside.    aSlena Anderson is a 24 y.o. female A1 6 week status post LMP who is pregnant who presents to the ED for evaluation of abdominal pain. She adds she was told by her MD to come in if she was feeling discomfort, and that is why she came in today.  Denies vaginal bleeding or fever    PAST MEDICAL HISTORY  Past Medical History[1]    SURGICAL HISTORY  Past Surgical History[2]    FAMILY HISTORY  History reviewed. No pertinent family history.    SOCIAL HISTORY   reports that she has never smoked. She has never used smokeless tobacco. She reports that she does not drink alcohol and does not use drugs.    CURRENT MEDICATIONS  Discharge Medication List as of 2025  9:59 PM        CONTINUE these medications which have NOT CHANGED    Details   lidocaine (XYLOCAINE) 2 % Solution Gargle and spit 5 mL every 6 hours as needed for sore throat., Disp-100 mL, R-0, Normal      FLUoxetine (PROZAC) 20 MG Cap Take 1 Capsule by mouth every day., Disp-30 Capsule, R-11, Normal      etonogestrel (NEXPLANON) 68 MG Implant implant Inject 1 Each under the skin one time., Historical Med           ALLERGIES  Allergies[3]     PHYSICAL EXAM  BP (!) 159/98   Pulse (!) 101   Temp 36.7 °C (98 °F) (Temporal)   Resp 16   Ht 1.702 m (5' 7\")   Wt 121 kg (266 lb 1.5 oz) "   LMP 06/03/2025 (Approximate)   SpO2 98%   BMI 41.68 kg/m²    General: No acute distress  Neuro: Awake alert and oriented, muscle strength sensation normal  Neck: Supple, FROM, no cervical spinal tenderness  Cardiac: Regular rate and rhythm  Pulmonary: Clear to auscultation bilaterally no distress  Abdomen: Soft nontender nondistended  Back: Nontender, no CVA tenderness, no spinal tenderness  Psych: Normal  Skin: Pink warm dry, no rash  Extremities: Full range of motion, compartments soft, muscle strength sensation intact 2+ pulses x 4    DIAGNOSTIC STUDIES/PROCEDURES  Labs:   Labs Reviewed   CBC WITH DIFFERENTIAL - Abnormal; Notable for the following components:       Result Value    WBC 11.8 (*)     Neutrophils-Polys 75.30 (*)     Lymphocytes 16.70 (*)     Neutrophils (Absolute) 8.86 (*)     All other components within normal limits   COMP METABOLIC PANEL - Abnormal; Notable for the following components:    Glucose 130 (*)     All other components within normal limits   HCG QUANTITATIVE - Abnormal; Notable for the following components:    Bhcg 23765.0 (*)     All other components within normal limits   URINALYSIS,CULTURE IF INDICATED - Abnormal; Notable for the following components:    Character Cloudy (*)     Ketones Trace (*)     Leukocyte Esterase Large (*)     All other components within normal limits   URINE MICROSCOPIC (W/UA) - Abnormal; Notable for the following components:    WBC  (*)     Bacteria Moderate (*)     Epithelial Cells 11-20 (*)     Ca Oxalate Crystal Present (*)     Urine Casts 3-5 (*)     All other components within normal limits   LIPASE   RH TYPE FOR RHOGAM FROM E.D.   ESTIMATED GFR     I have independently interpreted the above labs    Radiology:   This attending emergency physician has independently interpreted the diagnostic imaging associated with this visit and is awaiting the final reading from the radiologist.   Preliminary interpretation is a follows: P  Radiologist  interpretation:   US-OB 1ST TRIMESTER WITH TRANSVAGINAL (COMBO)   Final Result      1.  Single viable intrauterine gestation of estimated gestational age of 6 weeks.           COURSE & MEDICAL DECISION MAKING     INITIAL ASSESSMENT, COURSE AND PLAN  Differential diagnoses include but not limited to: Miscarriage, threatened miscarriage, ectopic pregnancy, pregnancy      Care Narrative:  6 weeks status post LMP presenting with abdominal pain    9:11 PM - Patient was first seen and evaluated at bedside. Patient who is pregnant presents to the ED for abdominal pain with a history of a miscarriage. Ordered for urine microscopic, CBC w diff, CMP, lipase, HCG quantitative, urinalysis, culture if indicated, US-OB transvaginal, RH type for rhogam from ED to evaluate. Patient verbalizes understanding and support with my plan of care.     9:30 PM - I reevaluated the patient at bedside. I discussed the patient's diagnostic study results which show no acute abnormalities. I discussed plan for discharge and follow up as outlined below. The patient is stable for discharge at this time and will return for any new or worsening symptoms. Patient verbalizes understanding and support with my plan for discharge.        ED COURSE AND ADDITIONAL PROBLEMS    1. Abdominal pain during pregnancy in first trimester Acute: Ultrasound shows an IUP.  No vaginal bleeding.  Follow-up with OB/GYN.   2. Threatened miscarriage Acute: Patient agrees with OB/GYN follow-up   3. Hyperglycemia Acute: We encouraged hydration       Medications - No data to display    DISPOSITION AND DISCUSSIONS    I have discussed management of the patient with the following physicians and MICHAEL's:  None    Discussion of management with other QHP or appropriate source(s): Ultrasound technologist    Escalation of care considered, and ultimately not performed: acute inpatient care management, however at this time, the patient is most appropriate for outpatient  management.    Barriers to care at this time, including but not limited to: None.     Decision tools and prescription drugs considered including, but not limited to: Zofran 4 mg tablet.    The patient will return for new or worsening symptoms and is stable at the time of discharge.    DISPOSITION:  Patient will be discharged home in stable condition.    FOLLOW UP:  Bettina Taylor P.A.-C.  [address]  [facility] [address]  [phone]    Schedule an appointment as soon as possible for a visit in 2 days      [facility]  [address]  [facility] [address]  [phone]  Schedule an appointment as soon as possible for a visit in 2 days      [facility], Emergency Dept  [address] [facility] [address] [phone]    As needed, If symptoms worsen      OUTPATIENT MEDICATIONS:  Discharge Medication List as of 7/16/2025  9:59 PM        START taking these medications    Details   ondansetron (ZOFRAN ODT) 4 MG TABLET DISPERSIBLE Take 1 Tablet by mouth every 6 hours as needed for Nausea/Vomiting., Disp-10 Tablet, R-0, Normal           FINAL DIAGNOSIS  1. Abdominal pain during pregnancy in first trimester Acute   2. Threatened miscarriage Acute   3. Hyperglycemia Acute        Sarah GALLARDO (Jeison), am scribing for, and in the presence of, Dago Mazariegos D.O..    Electronically signed by: Sarah Dowling), 7/16/2025    Dago GALLARDO D.O. personally performed the services described in this documentation, as scribed by Sarah Alexander in my presence, and it is both accurate and complete.      The note accurately reflects work and decisions made by me.  Dago Mazariegos D.O.  7/17/2025  2:57 AM         [1]   Past Medical History:  Diagnosis Date    Ingrown nail     Strep throat    [2]   Past Surgical History:  Procedure Laterality Date    APPENDECTOMY     [3] No Known Allergies

## 2025-07-17 NOTE — ED NOTES
Break RN: Pt signed and given d/c papers after discussion w/ ERP and reviewed results. Discussed x1 rx sent to [facility] pharmacy and OBGYN f/u. Pt denies further questions/concerns. Pt ambulated w/ steady gait, A&O x4 w/ all belongings to exit, stable and cleared for d/c

## 2025-07-17 NOTE — DISCHARGE INSTRUCTIONS
Your ultrasound appears as though you have an intrauterine pregnancy with heart rate.  We recommend that you follow-up with the OB/GYN and your primary care to establish prenatal care.  If anything worsens please return to the emergency department.

## 2025-07-17 NOTE — ED TRIAGE NOTES
"Chief Complaint   Patient presents with    Abdominal Pain     LLQ pain denies any vaginal bleeding     Pregnancy     5wks HCG levels were abnormal when tested 7/11 5482 7/13 4352     Sent by MD     Wanted to do an US on Friday but if any discomfort to come to ED before then.     N/V     Ambulatory to triage for above    Wants to check HCG again and get US if possible.     BP (!) 159/98   Pulse (!) 101   Temp 36.7 °C (98 °F) (Temporal)   Resp 16   Ht 1.702 m (5' 7\")   Wt 121 kg (266 lb 1.5 oz)   SpO2 98%   BMI 41.68 kg/m²     "